# Patient Record
Sex: FEMALE | Race: WHITE | NOT HISPANIC OR LATINO | Employment: OTHER | URBAN - METROPOLITAN AREA
[De-identification: names, ages, dates, MRNs, and addresses within clinical notes are randomized per-mention and may not be internally consistent; named-entity substitution may affect disease eponyms.]

---

## 2017-05-03 VITALS
RESPIRATION RATE: 18 BRPM | BODY MASS INDEX: 26.34 KG/M2 | HEART RATE: 77 BPM | SYSTOLIC BLOOD PRESSURE: 139 MMHG | DIASTOLIC BLOOD PRESSURE: 76 MMHG | WEIGHT: 144 LBS | TEMPERATURE: 98 F

## 2017-05-03 RX ORDER — METOPROLOL SUCCINATE 50 MG/1
50 TABLET, EXTENDED RELEASE ORAL DAILY
COMMUNITY
End: 2017-06-15

## 2017-05-03 RX ORDER — LOSARTAN POTASSIUM 100 MG/1
100 TABLET ORAL DAILY
COMMUNITY

## 2017-05-03 RX ORDER — MULTIVITAMIN
1 TABLET ORAL DAILY
COMMUNITY

## 2017-05-22 RX ORDER — MULTIVITAMIN
TABLET ORAL
COMMUNITY
End: 2017-06-15 | Stop reason: SDUPTHER

## 2017-05-22 RX ORDER — MV/FA/DHA/EPA/FISH OIL/SAW/GNK 400MCG-200
COMBINATION PACKAGE (EA) ORAL
COMMUNITY

## 2017-05-22 RX ORDER — GUAIFENESIN AND PHENYLEPHRINE HCL 400; 10 MG/1; MG/1
TABLET ORAL
COMMUNITY

## 2017-05-22 RX ORDER — METOPROLOL TARTRATE 25 MG/1
TABLET, FILM COATED ORAL
COMMUNITY
End: 2017-06-15 | Stop reason: SDUPTHER

## 2017-05-23 ENCOUNTER — TELEPHONE (OUTPATIENT)
Dept: HEMATOLOGY/ONCOLOGY | Facility: CLINIC | Age: 72
End: 2017-05-23

## 2017-06-15 ENCOUNTER — OFFICE VISIT (OUTPATIENT)
Dept: HEMATOLOGY/ONCOLOGY | Facility: CLINIC | Age: 72
End: 2017-06-15
Payer: MEDICARE

## 2017-06-15 VITALS
TEMPERATURE: 98 F | RESPIRATION RATE: 18 BRPM | HEIGHT: 62 IN | HEART RATE: 101 BPM | WEIGHT: 146.19 LBS | SYSTOLIC BLOOD PRESSURE: 136 MMHG | BODY MASS INDEX: 26.9 KG/M2 | DIASTOLIC BLOOD PRESSURE: 80 MMHG

## 2017-06-15 DIAGNOSIS — C50.411 MALIGNANT NEOPLASM OF UPPER-OUTER QUADRANT OF RIGHT FEMALE BREAST: ICD-10-CM

## 2017-06-15 DIAGNOSIS — T45.1X5A ANEMIA DUE TO CHEMOTHERAPY: ICD-10-CM

## 2017-06-15 DIAGNOSIS — Z17.1 ESTROGEN RECEPTOR NEGATIVE STATUS (ER-): ICD-10-CM

## 2017-06-15 DIAGNOSIS — D64.81 ANEMIA DUE TO CHEMOTHERAPY: ICD-10-CM

## 2017-06-15 PROCEDURE — 1159F MED LIST DOCD IN RCRD: CPT | Mod: ,,, | Performed by: INTERNAL MEDICINE

## 2017-06-15 PROCEDURE — 1126F AMNT PAIN NOTED NONE PRSNT: CPT | Mod: ,,, | Performed by: INTERNAL MEDICINE

## 2017-06-15 PROCEDURE — 99214 OFFICE O/P EST MOD 30 MIN: CPT | Mod: ,,, | Performed by: INTERNAL MEDICINE

## 2017-06-15 RX ORDER — METOPROLOL TARTRATE 25 MG/1
25 TABLET, FILM COATED ORAL 2 TIMES DAILY
COMMUNITY

## 2017-06-15 NOTE — PROGRESS NOTES
[facility] Hematology/Oncology            PROGRESS NOTE      Subjective:       Patient ID:   NAME: Eugenia Darling : 1945     71 y.o. female    Referring Doc: Yoselyn  Other Physicians: Tammi Fernandez    Chief Complaint:  Breast CA f/u    History of Present Illness:     Patient returns today for a regularly scheduled follow-up visit.  The patient is here with her . She is doing ok with no new issues. No CP, SOB, HA's or N/V            ROS:   GEN: normal without any fever, night sweats or weight loss  HEENT: normal with no HA's, sore throat, stiff neck, changes in vision  CV: normal with no CP, SOB, PND, LEAVITT or orthopnea  PULM: normal with no SOB, cough, hemoptysis, sputum or pleuritic pain  GI: normal with no abdominal pain, nausea, vomiting, constipation, diarrhea, melanotic stools, BRBPR, or hematemesis  : normal with no hematuria, dysuria  BREAST: normal with no mass, discharge, pain  SKIN: normal with no rash, erythema, bruising, or swelling    Allergies:  Review of patient's allergies indicates:  No Known Allergies    Medications:    Current Outpatient Prescriptions:     aspirin 81 MG Chew, Take 81 mg by mouth once daily., Disp: , Rfl:     CALCIUM CARBONATE (CALCIUM 500 ORAL), Take 500 mg by mouth once daily., Disp: , Rfl:     krill oil 500 mg Cap, Take by mouth., Disp: , Rfl:     losartan (COZAAR) 100 MG tablet, Take 100 mg by mouth once daily., Disp: , Rfl:     metoprolol tartrate (LOPRESSOR) 25 MG tablet, Take 25 mg by mouth 2 (two) times daily., Disp: , Rfl:     multivitamin (ONE DAILY MULTIVITAMIN) per tablet, Take 1 tablet by mouth once daily., Disp: , Rfl:     simvastatin (ZOCOR) 10 MG tablet, Take 10 mg by mouth every evening., Disp: , Rfl:     turmeric root extract 500 mg Cap, Take by mouth., Disp: , Rfl:     PMHx/PSHx Updates:  See patient's last visit with me on 2017.  See H&P on 11/16/15      Pathology:  See Vol #1 11/16/15 note          Objective:     Vitals:  Blood  "pressure 136/80, pulse 101, temperature 98.1 °F (36.7 °C), resp. rate 18, height 5' 1.5" (1.562 m), weight 66.3 kg (146 lb 3.2 oz).    Physical Examination:   GEN: no apparent distress, comfortable; AAOx3  HEAD: atraumatic and normocephalic  EYES: no pallor, no icterus, PERRLA  ENT: OMM, no pharyngeal erythema, external ears WNL; no nasal discharge; no thrush  NECK: no masses, thyroid normal, trachea midline, no LAD/LN's, supple  CV: RRR with no murmur; normal pulse; normal S1 and S2; no pedal edema  CHEST: Normal respiratory effort; CTAB; normal breath sounds; no wheeze or crackles  ABDOM: nontender and nondistended; soft; normal bowel sounds; no rebound/guarding  MUSC/Skeletal: ROM normal; no crepitus; joints normal; no deformities or arthropathy  EXTREM: no clubbing, cyanosis, inflammation or swelling  SKIN: no rashes, lesions, ulcers, petechiae or subcutaneous nodules  : no turner  NEURO: grossly intact; motor/sensory WNL; AAOx3; no tremors  PSYCH: normal mood, affect and behavior  LYMPH: normal cervical, supraclavicular, axillary and groin LN's  BREAST: no changes          Labs:   5/22/17 [facility]    I have reviewed all available lab results and radiology reports.    Radiology/Diagnostic Studies:        Assessment/Plan:   (1) 71 y.o. female with diagnosis of right breast cancer  - stage IIIA   Triple neg  - s/p right lumpectomy in Oct 2015 by Dr Cadena with 4 out of 13 LN's that were positive  - s/p AC, taxol and XRT  - jabier on 5/17/17 stable  - mammo due Oct 2017  - PET/CT 12/14/16 negative      (2) HTN - followed by Dr Topete; BP good currently    PLAN:  1. mammo due Oct 2017  2. Consider repeat PET in Dec 2017 if insurance allows otherwise at least a CXR  3. F/u with PCP, etc  4. Fax note to Dr Cadena and Yoselyn    RTC 3-4 months      I spent over 25 mins with the patient, of which over half was face to face with the patient.       Discussion:     I have explained all of the above in detail and the patient " understands all of the current recommendation(s). I have answered all of their questions to the best of my ability and to their complete satisfaction.   The patient is to continue with the current management plan.    RTC in  3-4 months            Electronically signed by Abel Calle MD

## 2017-06-15 NOTE — LETTER
Nichelle 15, 2017      Manpreet Topete MD  [address]  [place] [address]           [facility] Hematology Oncology  [address] [facility] [facility]  [address]  [place] [address]  Phone: [phone]  Fax: [phone]          Patient: Eugenia Darling   MR Number: [id number]   YOB: 1945   Date of Visit: 6/15/2017       Dear Dr. Manpreet Topete:    Thank you for referring Eugenia Darling to me for evaluation. Attached you will find relevant portions of my assessment and plan of care.    If you have questions, please do not hesitate to call me. I look forward to following Eugenia Darling along with you.    Sincerely,    Abel Calle MD    Enclosure  CC:  No Recipients    If you would like to receive this communication electronically, please contact [email or web address] or [phone] to request more information on [employer] Link access.    For providers and/or their staff who would like to refer a patient to [facility], please contact us through our one-stop-shop provider referral line, [facility], at [phone].    If you feel you have received this communication in error or would no longer like to receive these types of communications, please e-mail [email or web address]

## 2017-10-28 LAB
ALBUMIN SERPL-MCNC: 4.3 G/DL (ref 3.6–5.1)
ALBUMIN/GLOB SERPL: 1.7 (CALC) (ref 1–2.5)
ALP SERPL-CCNC: 68 U/L (ref 33–130)
ALT SERPL-CCNC: 19 U/L (ref 6–29)
AST SERPL-CCNC: 20 U/L (ref 10–35)
BASOPHILS # BLD AUTO: 12 CELLS/UL (ref 0–200)
BASOPHILS NFR BLD AUTO: 0.3 %
BILIRUB SERPL-MCNC: 0.7 MG/DL (ref 0.2–1.2)
BUN SERPL-MCNC: 14 MG/DL (ref 7–25)
BUN/CREAT SERPL: NORMAL (CALC) (ref 6–22)
CALCIUM SERPL-MCNC: 9.3 MG/DL (ref 8.6–10.4)
CHLORIDE SERPL-SCNC: 103 MMOL/L (ref 98–110)
CO2 SERPL-SCNC: 30 MMOL/L (ref 20–31)
CREAT SERPL-MCNC: 0.67 MG/DL (ref 0.6–0.93)
EOSINOPHIL # BLD AUTO: 129 CELLS/UL (ref 15–500)
EOSINOPHIL NFR BLD AUTO: 3.3 %
ERYTHROCYTE [DISTWIDTH] IN BLOOD BY AUTOMATED COUNT: 11.6 % (ref 11–15)
GFR SERPL CREATININE-BSD FRML MDRD: 88 ML/MIN/1.73M2
GLOBULIN SER CALC-MCNC: 2.6 G/DL (CALC) (ref 1.9–3.7)
GLUCOSE SERPL-MCNC: 94 MG/DL (ref 65–99)
HCT VFR BLD AUTO: 39.5 % (ref 35–45)
HGB BLD-MCNC: 13.4 G/DL (ref 11.7–15.5)
LYMPHOCYTES # BLD AUTO: 1158 CELLS/UL (ref 850–3900)
LYMPHOCYTES NFR BLD AUTO: 29.7 %
MCH RBC QN AUTO: 31.6 PG (ref 27–33)
MCHC RBC AUTO-ENTMCNC: 33.9 G/DL (ref 32–36)
MCV RBC AUTO: 93.2 FL (ref 80–100)
MONOCYTES # BLD AUTO: 546 CELLS/UL (ref 200–950)
MONOCYTES NFR BLD AUTO: 14 %
NEUTROPHILS # BLD AUTO: 2055 CELLS/UL (ref 1500–7800)
NEUTROPHILS NFR BLD AUTO: 52.7 %
PLATELET # BLD AUTO: 172 THOUSAND/UL (ref 140–400)
PMV BLD REES-ECKER: 10.6 FL (ref 7.5–12.5)
POTASSIUM SERPL-SCNC: 4.8 MMOL/L (ref 3.5–5.3)
PROT SERPL-MCNC: 6.9 G/DL (ref 6.1–8.1)
RBC # BLD AUTO: 4.24 MILLION/UL (ref 3.8–5.1)
SODIUM SERPL-SCNC: 142 MMOL/L (ref 135–146)
WBC # BLD AUTO: 3.9 THOUSAND/UL (ref 3.8–10.8)

## 2017-10-30 ENCOUNTER — TELEPHONE (OUTPATIENT)
Dept: HEMATOLOGY/ONCOLOGY | Facility: CLINIC | Age: 72
End: 2017-10-30

## 2017-10-31 ENCOUNTER — OFFICE VISIT (OUTPATIENT)
Dept: HEMATOLOGY/ONCOLOGY | Facility: CLINIC | Age: 72
End: 2017-10-31
Payer: MEDICARE

## 2017-10-31 VITALS
WEIGHT: 142 LBS | HEIGHT: 61 IN | BODY MASS INDEX: 26.81 KG/M2 | DIASTOLIC BLOOD PRESSURE: 85 MMHG | SYSTOLIC BLOOD PRESSURE: 137 MMHG | HEART RATE: 66 BPM | RESPIRATION RATE: 18 BRPM | TEMPERATURE: 98 F

## 2017-10-31 DIAGNOSIS — D64.81 ANEMIA DUE TO CHEMOTHERAPY: ICD-10-CM

## 2017-10-31 DIAGNOSIS — Z17.1 MALIGNANT NEOPLASM OF UPPER-OUTER QUADRANT OF RIGHT BREAST IN FEMALE, ESTROGEN RECEPTOR NEGATIVE: Primary | ICD-10-CM

## 2017-10-31 DIAGNOSIS — C50.411 MALIGNANT NEOPLASM OF UPPER-OUTER QUADRANT OF RIGHT BREAST IN FEMALE, ESTROGEN RECEPTOR NEGATIVE: Primary | ICD-10-CM

## 2017-10-31 DIAGNOSIS — Z17.1 ESTROGEN RECEPTOR NEGATIVE TUMOR STATUS: ICD-10-CM

## 2017-10-31 DIAGNOSIS — T45.1X5A ANEMIA DUE TO CHEMOTHERAPY: ICD-10-CM

## 2017-10-31 PROCEDURE — 99214 OFFICE O/P EST MOD 30 MIN: CPT | Mod: ,,, | Performed by: INTERNAL MEDICINE

## 2017-10-31 NOTE — PROGRESS NOTES
[facility] Hematology/Oncology  PROGRESS NOTE      Subjective:       Patient ID:   NAME: Eugenia Darling : 1945     71 y.o. female    Referring Doc: Yoselyn  Other Physicians: Tammi Fernandez    Chief Complaint:  Breast ca f/u    History of Present Illness:     Patient returns today for a regularly scheduled follow-up visit.  The patient is here with her . She had recent mammo yesterday which is reported good. She is doing ok with no new issues. She denies any CP, SOB, HA's or N/V. Mild but very slight and intermittent neuropathy (1x time per week)            ROS:   GEN: normal without any fever, night sweats or weight loss  HEENT: normal with no HA's, sore throat, stiff neck, changes in vision  CV: normal with no CP, SOB, PND, LEAVITT or orthopnea  PULM: normal with no SOB, cough, hemoptysis, sputum or pleuritic pain  GI: normal with no abdominal pain, nausea, vomiting, constipation, diarrhea, melanotic stools, BRBPR, or hematemesis  : normal with no hematuria, dysuria  BREAST: normal with no mass, discharge, pain  SKIN: normal with no rash, erythema, bruising, or swelling    Allergies:  Review of patient's allergies indicates:   Allergen Reactions    Adhesive tape-silicones Rash       Medications:    Current Outpatient Prescriptions:     LACTOBACILLUS RHAMNOSUS GG (CULTURELLE ORAL), Take by mouth., Disp: , Rfl:     aspirin 81 MG Chew, Take 81 mg by mouth once daily., Disp: , Rfl:     CALCIUM CARBONATE (CALCIUM 500 ORAL), Take 500 mg by mouth once daily., Disp: , Rfl:     krill oil 500 mg Cap, Take by mouth., Disp: , Rfl:     losartan (COZAAR) 100 MG tablet, Take 100 mg by mouth once daily., Disp: , Rfl:     metoprolol tartrate (LOPRESSOR) 25 MG tablet, Take 25 mg by mouth 2 (two) times daily., Disp: , Rfl:     multivitamin (ONE DAILY MULTIVITAMIN) per tablet, Take 1 tablet by mouth once daily., Disp: , Rfl:     simvastatin (ZOCOR) 10 MG tablet, Take 10 mg by mouth every evening., Disp: , Rfl:      "turmeric root extract 500 mg Cap, Take by mouth., Disp: , Rfl:     PMHx/PSHx Updates:  See patient's last visit with me on 6/15/2017.  See H&P on Vol #1        Pathology:  See Vol #1          Objective:     Vitals:  Blood pressure 137/85, pulse 66, temperature 98.1 °F (36.7 °C), resp. rate 18, height 5' 1" (1.549 m), weight 64.4 kg (142 lb).    Physical Examination:   GEN: no apparent distress, comfortable; AAOx3  HEAD: atraumatic and normocephalic  EYES: no pallor, no icterus, PERRLA  ENT: OMM, no pharyngeal erythema, external ears WNL; no nasal discharge; no thrush  NECK: no masses, thyroid normal, trachea midline, no LAD/LN's, supple  CV: RRR with no murmur; normal pulse; normal S1 and S2; no pedal edema  CHEST: Normal respiratory effort; CTAB; normal breath sounds; no wheeze or crackles  ABDOM: nontender and nondistended; soft; normal bowel sounds; no rebound/guarding  MUSC/Skeletal: ROM normal; no crepitus; joints normal; no deformities or arthropathy  EXTREM: no clubbing, cyanosis, inflammation or swelling  SKIN: no rashes, lesions, ulcers, petechiae or subcutaneous nodules  : no turner  NEURO: grossly intact; motor/sensory WNL; AAOx3; no tremors  PSYCH: normal mood, affect and behavior  LYMPH: normal cervical, supraclavicular, axillary and groin LN's  BREAST: no changes          Labs:     10/27/2017 on chart      Radiology/Diagnostic Studies:    [facility] Unknown Rad Eap    Result Date: 10/30/2017  CMS MANDATED QUALITY DATA - MAMMOGRAPHY - [facility] utilizes a reminder system to ensure that all patients receive reminder letters and/or direct phone calls for appointments. ?This includes ?reminders for routine screening mammograms, diagnostic mammograms, and other breast imaging interventions when appropriate. ?This patient will be placed in the?appropriate reminder system. BILATERAL DIGITAL DIAGNOSTIC MAMMOGRAM with CAD with tomosynthesis CLINICAL INFORMATION: Short-term follow-up following " lumpectomy for right breast carcinoma. Technologist: Ina Vega. FINDINGS: Comparison made with prior mammograms dating back to 08/13/2014. The breasts are heterogeneously dense, which lowers the sensitivity of mammography. There is scarring in the right breast at 12 o'clock position from previous lumpectomy. In the rest of the breast, there are no other areas of architectural distortion and there are no masses or suspicious grouped calcifications. IMPRESSION: There is no evidence of malignancy. Recommend routine screening. BI-RADS CATEGORY 1: NEGATIVE. Read and electronically signed by: Jonah Gu MD on 10/30/2017 9:29 AM CDT JONAH GU MD      I have reviewed all available lab results and radiology reports.    Assessment/Plan:     (1) 71 y.o. female with diagnosis of right breast cancer  - stage IIIA   Triple neg  - s/p right lumpectomy in Oct 2015 by Dr Cadena with 4 out of 13 LN's that were positive  - s/p AC, taxol and XRT  - jabier on 5/17/17 stable  - mammo 10/30 was negative  - PET/CT 12/14/16 negative        (2) HTN - followed by Dr Topete; BP good currently       PLAN:  1. Schedule PET scan for Dec 2017  2. F/u with PCP  3. RTC in 3-4 months  4. mammo next Oct 2018  5. GYN f/u in 2 months ([place])  Fax note to  Manpreet Topete MD, LENORE Packer and Tammi    Discussion:     I have explained all of the above in detail and the patient understands all of the current recommendation(s). I have answered all of their questions to the best of my ability and to their complete satisfaction.   The patient is to continue with the current management plan.            Electronically signed by Abel Calle MD

## 2018-01-11 LAB — GLUCOSE SERPL-MCNC: 92 MG/DL (ref 70–99)

## 2018-01-12 ENCOUNTER — TELEPHONE (OUTPATIENT)
Dept: HEMATOLOGY/ONCOLOGY | Facility: CLINIC | Age: 73
End: 2018-01-12

## 2018-02-19 ENCOUNTER — OFFICE VISIT (OUTPATIENT)
Dept: HEMATOLOGY/ONCOLOGY | Facility: CLINIC | Age: 73
End: 2018-02-19
Payer: MEDICARE

## 2018-02-19 VITALS
BODY MASS INDEX: 26.85 KG/M2 | HEART RATE: 69 BPM | RESPIRATION RATE: 18 BRPM | TEMPERATURE: 98 F | WEIGHT: 142.13 LBS | DIASTOLIC BLOOD PRESSURE: 86 MMHG | SYSTOLIC BLOOD PRESSURE: 149 MMHG

## 2018-02-19 DIAGNOSIS — T45.1X5A ANEMIA DUE TO CHEMOTHERAPY: ICD-10-CM

## 2018-02-19 DIAGNOSIS — D64.81 ANEMIA DUE TO CHEMOTHERAPY: ICD-10-CM

## 2018-02-19 DIAGNOSIS — C50.411 MALIGNANT NEOPLASM OF UPPER-OUTER QUADRANT OF RIGHT BREAST IN FEMALE, ESTROGEN RECEPTOR NEGATIVE: Primary | ICD-10-CM

## 2018-02-19 DIAGNOSIS — Z17.1 MALIGNANT NEOPLASM OF UPPER-OUTER QUADRANT OF RIGHT BREAST IN FEMALE, ESTROGEN RECEPTOR NEGATIVE: Primary | ICD-10-CM

## 2018-02-19 DIAGNOSIS — Z17.1 ESTROGEN RECEPTOR NEGATIVE TUMOR STATUS: ICD-10-CM

## 2018-02-19 PROCEDURE — 1126F AMNT PAIN NOTED NONE PRSNT: CPT | Mod: ,,, | Performed by: INTERNAL MEDICINE

## 2018-02-19 PROCEDURE — 99214 OFFICE O/P EST MOD 30 MIN: CPT | Mod: ,,, | Performed by: INTERNAL MEDICINE

## 2018-02-19 PROCEDURE — 1159F MED LIST DOCD IN RCRD: CPT | Mod: ,,, | Performed by: INTERNAL MEDICINE

## 2018-02-19 PROCEDURE — 3008F BODY MASS INDEX DOCD: CPT | Mod: ,,, | Performed by: INTERNAL MEDICINE

## 2018-02-19 NOTE — PROGRESS NOTES
[facility] Hematology/Oncology  PROGRESS NOTE      Subjective:       Patient ID:   NAME: Eugenia Darling : 1945     72 y.o. female    Referring Doc: Yoselyn  Other Physicians: Tammi Fernandez    Chief Complaint:  Breast ca f/u    History of Present Illness:     Patient returns today for a regularly scheduled follow-up visit.  The patient is here with her . She reports that she feels great. She just got back from a cruise. She is doing ok with no new issues. She denies any CP, SOB, HA's or N/V. Mild but very slight and intermittent neuropathy (1x time per week)            ROS:   GEN: normal without any fever, night sweats or weight loss  HEENT: normal with no HA's, sore throat, stiff neck, changes in vision  CV: normal with no CP, SOB, PND, LEAVITT or orthopnea  PULM: normal with no SOB, cough, hemoptysis, sputum or pleuritic pain  GI: normal with no abdominal pain, nausea, vomiting, constipation, diarrhea, melanotic stools, BRBPR, or hematemesis  : normal with no hematuria, dysuria  BREAST: normal with no mass, discharge, pain  SKIN: normal with no rash, erythema, bruising, or swelling    Allergies:  Review of patient's allergies indicates:   Allergen Reactions    Adhesive tape-silicones Rash       Medications:    Current Outpatient Prescriptions:     aspirin 81 MG Chew, Take 81 mg by mouth once daily., Disp: , Rfl:     CALCIUM CARBONATE (CALCIUM 500 ORAL), Take 500 mg by mouth once daily., Disp: , Rfl:     krill oil 500 mg Cap, Take by mouth., Disp: , Rfl:     LACTOBACILLUS RHAMNOSUS GG (CULTURELLE ORAL), Take by mouth., Disp: , Rfl:     losartan (COZAAR) 100 MG tablet, Take 100 mg by mouth once daily., Disp: , Rfl:     metoprolol tartrate (LOPRESSOR) 25 MG tablet, Take 25 mg by mouth 2 (two) times daily., Disp: , Rfl:     multivitamin (ONE DAILY MULTIVITAMIN) per tablet, Take 1 tablet by mouth once daily., Disp: , Rfl:     simvastatin (ZOCOR) 10 MG tablet, Take 10 mg by mouth every evening., Disp:  , Rfl:     turmeric root extract 500 mg Cap, Take by mouth., Disp: , Rfl:     PMHx/PSHx Updates:  See patient's last visit with me on 12/19/2017.  See H&P on Vol #1Dictation #1  MRN:[id number]  CSN:[id number]          Pathology:  See Vol #1          Objective:     Vitals:  Blood pressure (!) 149/86, pulse 69, temperature 97.9 °F (36.6 °C), resp. rate 18, weight 64.5 kg (142 lb 1.6 oz).    Physical Examination:   GEN: no apparent distress, comfortable; AAOx3  HEAD: atraumatic and normocephalic  EYES: no pallor, no icterus, PERRLA  ENT: OMM, no pharyngeal erythema, external ears WNL; no nasal discharge; no thrush  NECK: no masses, thyroid normal, trachea midline, no LAD/LN's, supple  CV: RRR with no murmur; normal pulse; normal S1 and S2; no pedal edema  CHEST: Normal respiratory effort; CTAB; normal breath sounds; no wheeze or crackles  ABDOM: nontender and nondistended; soft; normal bowel sounds; no rebound/guarding  MUSC/Skeletal: ROM normal; no crepitus; joints normal; no deformities or arthropathy  EXTREM: no clubbing, cyanosis, inflammation or swelling  SKIN: no rashes, lesions, ulcers, petechiae or subcutaneous nodules  : no turner  NEURO: grossly intact; motor/sensory WNL; AAOx3; no tremors  PSYCH: normal mood, affect and behavior  LYMPH: normal cervical, supraclavicular, axillary and groin LN's  BREAST: no changes          Labs:     10/27/2017 on chart      Radiology/Diagnostic Studies:        PET scan:   1/11/2018    IMPRESSION:    Negative for metastatic disease or residual/recurrent malignancy.          [facility] Unknown Rad Eap    Result Date: 10/30/2017  CMS MANDATED QUALITY DATA - MAMMOGRAPHY - [facility] utilizes a reminder system to ensure that all patients receive reminder letters and/or direct phone calls for appointments. ?This includes ?reminders for routine screening mammograms, diagnostic mammograms, and other breast imaging interventions when appropriate. ?This patient will be  placed in the?appropriate reminder system. BILATERAL DIGITAL DIAGNOSTIC MAMMOGRAM with CAD with tomosynthesis CLINICAL INFORMATION: Short-term follow-up following lumpectomy for right breast carcinoma. Technologist: Ina Vega. FINDINGS: Comparison made with prior mammograms dating back to 08/13/2014. The breasts are heterogeneously dense, which lowers the sensitivity of mammography. There is scarring in the right breast at 12 o'clock position from previous lumpectomy. In the rest of the breast, there are no other areas of architectural distortion and there are no masses or suspicious grouped calcifications. IMPRESSION: There is no evidence of malignancy. Recommend routine screening. BI-RADS CATEGORY 1: NEGATIVE. Read and electronically signed by: Jonah Gu MD on 10/30/2017 9:29 AM CDT JONAH GU MD      I have reviewed all available lab results and radiology reports.    Assessment/Plan:     (1) 72 y.o. female with diagnosis of right breast cancer  - stage IIIA   Triple neg  - s/p right lumpectomy in Oct 2015 by Dr Cadena with 4 out of 13 LN's that were positive  - s/p AC, taxol and XRT  - jabier on 5/17/17 stable  - mammo 10/30 was negative  - PET/CT 1/11/2018 is negative        (2) HTN - followed by Dr Topete; BP good currently       PLAN:  1. Check up to date labs  2. F/u with PCP, GYN, Gen Surg etc  3. RTC in 3-4 months  4. mammo next Oct 2018  5. Fax note to  Manpreet Topete MD, LENORE Packer and Tammi    Discussion:     I have explained all of the above in detail and the patient understands all of the current recommendation(s). I have answered all of their questions to the best of my ability and to their complete satisfaction.   The patient is to continue with the current management plan.            Electronically signed by Abel Calle MD

## 2018-02-19 NOTE — LETTER
February 19, 2018      Manpreet Topete MD  [address]  [place] [address]           [facility] Hematology Oncology  [address]  [address]  [place] [address]  Phone: [phone]  Fax: [phone]          Patient: Eugenia Darling   MR Number: [id number]   YOB: 1945   Date of Visit: 2/19/2018       Dear Dr. Manpreet Topete:    Thank you for referring Eugenia Darling to me for evaluation. Attached you will find relevant portions of my assessment and plan of care.    If you have questions, please do not hesitate to call me. I look forward to following Eugenia Darling along with you.    Sincerely,    Abel Calle MD    Enclosure  CC:  No Recipients    If you would like to receive this communication electronically, please contact [email or web address] or [phone] to request more information on [email or web address] access.    For providers and/or their staff who would like to refer a patient to [facility], please contact us through our one-stop-shop provider referral line, [facility], at [phone].    If you feel you have received this communication in error or would no longer like to receive these types of communications, please e-mail [email or web address]

## 2018-06-18 ENCOUNTER — OFFICE VISIT (OUTPATIENT)
Dept: HEMATOLOGY/ONCOLOGY | Facility: CLINIC | Age: 73
End: 2018-06-18
Payer: MEDICARE

## 2018-06-18 VITALS
HEART RATE: 92 BPM | HEIGHT: 62 IN | WEIGHT: 144.38 LBS | DIASTOLIC BLOOD PRESSURE: 89 MMHG | SYSTOLIC BLOOD PRESSURE: 165 MMHG | BODY MASS INDEX: 26.57 KG/M2 | TEMPERATURE: 98 F

## 2018-06-18 DIAGNOSIS — C50.411 MALIGNANT NEOPLASM OF UPPER-OUTER QUADRANT OF RIGHT BREAST IN FEMALE, ESTROGEN RECEPTOR NEGATIVE: ICD-10-CM

## 2018-06-18 DIAGNOSIS — R19.7 DIARRHEA, UNSPECIFIED TYPE: ICD-10-CM

## 2018-06-18 DIAGNOSIS — Z17.1 ESTROGEN RECEPTOR NEGATIVE TUMOR STATUS: Primary | ICD-10-CM

## 2018-06-18 DIAGNOSIS — Z17.1 MALIGNANT NEOPLASM OF UPPER-OUTER QUADRANT OF RIGHT BREAST IN FEMALE, ESTROGEN RECEPTOR NEGATIVE: ICD-10-CM

## 2018-06-18 PROCEDURE — 99214 OFFICE O/P EST MOD 30 MIN: CPT | Mod: ,,, | Performed by: INTERNAL MEDICINE

## 2018-06-18 NOTE — PROGRESS NOTES
[facility] Hematology/Oncology  PROGRESS NOTE      Subjective:       Patient ID:   NAME: Eugenia Darling : 1945     72 y.o. female    Referring Doc: Yoselyn  Other Physicians: Tammi Fernandez, Jd    Chief Complaint:  Breast ca f/u    History of Present Illness:     Patient returns today for a regularly scheduled follow-up visit.  The patient is here with her . She reports that she feels great. She just got back from a cruise. She is doing ok with no new issues. She denies any CP, SOB, HA's or N/V. She has been having some chronic diarrhea and she has seen Dr Topete for it          ROS:   GEN: normal without any fever, night sweats or weight loss  HEENT: normal with no HA's, sore throat, stiff neck, changes in vision  CV: normal with no CP, SOB, PND, LEAVITT or orthopnea  PULM: normal with no SOB, cough, hemoptysis, sputum or pleuritic pain  GI:  no abdominal pain, nausea, vomiting, constipation,, melanotic stools, BRBPR, or hematemesis; diarrhea chronic  : normal with no hematuria, dysuria  BREAST: normal with no mass, discharge, pain  SKIN: normal with no rash, erythema, bruising, or swelling    Allergies:  Review of patient's allergies indicates:   Allergen Reactions    Adhesive tape-silicones Rash       Medications:    Current Outpatient Prescriptions:     aspirin 81 MG Chew, Take 81 mg by mouth once daily., Disp: , Rfl:     CALCIUM CARBONATE (CALCIUM 500 ORAL), Take 500 mg by mouth once daily., Disp: , Rfl:     krill oil 500 mg Cap, Take by mouth., Disp: , Rfl:     LACTOBACILLUS RHAMNOSUS GG (CULTURELLE ORAL), Take by mouth., Disp: , Rfl:     losartan (COZAAR) 100 MG tablet, Take 100 mg by mouth once daily., Disp: , Rfl:     metoprolol tartrate (LOPRESSOR) 25 MG tablet, Take 25 mg by mouth 2 (two) times daily., Disp: , Rfl:     multivitamin (ONE DAILY MULTIVITAMIN) per tablet, Take 1 tablet by mouth once daily., Disp: , Rfl:     simvastatin (ZOCOR) 10 MG tablet, Take 10 mg by mouth every  "evening., Disp: , Rfl:     turmeric root extract 500 mg Cap, Take by mouth., Disp: , Rfl:     PMHx/PSHx Updates:  See patient's last visit with me on 2/19/2018  See H&P on Vol #1            Pathology:  See Vol #1          Objective:     Vitals:  Blood pressure (!) 165/89, pulse 92, temperature 98.4 °F (36.9 °C), height 5' 1.5" (1.562 m), weight 65.5 kg (144 lb 6.4 oz).    Physical Examination:   GEN: no apparent distress, comfortable; AAOx3  HEAD: atraumatic and normocephalic  EYES: no pallor, no icterus, PERRLA  ENT: OMM, no pharyngeal erythema, external ears WNL; no nasal discharge; no thrush  NECK: no masses, thyroid normal, trachea midline, no LAD/LN's, supple  CV: RRR with no murmur; normal pulse; normal S1 and S2; no pedal edema  CHEST: Normal respiratory effort; CTAB; normal breath sounds; no wheeze or crackles  ABDOM: nontender and nondistended; soft; normal bowel sounds; no rebound/guarding  MUSC/Skeletal: ROM normal; no crepitus; joints normal; no deformities or arthropathy  EXTREM: no clubbing, cyanosis, inflammation or swelling  SKIN: no rashes, lesions, ulcers, petechiae or subcutaneous nodules  : no turner  NEURO: grossly intact; motor/sensory WNL; AAOx3; no tremors  PSYCH: normal mood, affect and behavior  LYMPH: normal cervical, supraclavicular, axillary and groin LN's  BREAST: no changes          Labs:     6/13/2018  Lab Results   Component Value Date    WBC 4.6 06/13/2018    HGB 12.3 06/13/2018    HCT 36.2 06/13/2018    MCV 92.6 06/13/2018     06/13/2018     BMP  Lab Results   Component Value Date     06/13/2018    K 4.7 06/13/2018     06/13/2018    CO2 29 06/13/2018    BUN 13 06/13/2018    CREATININE 0.64 06/13/2018    CALCIUM 9.0 06/13/2018    ESTGFRAFRICA 103 06/13/2018    EGFRNONAA 89 06/13/2018     Lab Results   Component Value Date    ALT 23 06/13/2018    AST 22 06/13/2018    ALKPHOS 63 06/13/2018    BILITOT 0.7 06/13/2018           Radiology/Diagnostic " Studies:        PET scan:   1/11/2018    IMPRESSION:    Negative for metastatic disease or residual/recurrent malignancy.          [facility] Unknown Rad Eap    Result Date: 10/30/2017  CMS MANDATED QUALITY DATA - MAMMOGRAPHY - [facility] utilizes a reminder system to ensure that all patients receive reminder letters and/or direct phone calls for appointments. ?This includes ?reminders for routine screening mammograms, diagnostic mammograms, and other breast imaging interventions when appropriate. ?This patient will be placed in the?appropriate reminder system. BILATERAL DIGITAL DIAGNOSTIC MAMMOGRAM with CAD with tomosynthesis CLINICAL INFORMATION: Short-term follow-up following lumpectomy for right breast carcinoma. Technologist: Ina Vega. FINDINGS: Comparison made with prior mammograms dating back to 08/13/2014. The breasts are heterogeneously dense, which lowers the sensitivity of mammography. There is scarring in the right breast at 12 o'clock position from previous lumpectomy. In the rest of the breast, there are no other areas of architectural distortion and there are no masses or suspicious grouped calcifications.     IMPRESSION: There is no evidence of malignancy. Recommend routine screening. BI-RADS CATEGORY 1: NEGATIVE. Read and electronically signed by: Jonah Gu MD on 10/30/2017 9:29 AM CDT JONAH GU MD      I have reviewed all available lab results and radiology reports.    Assessment/Plan:     (1) 72 y.o. female with diagnosis of right breast cancer  - stage IIIA   Triple neg  - s/p right lumpectomy in Oct 2015 by Dr Cadena with 4 out of 13 LN's that were positive  - s/p AC, taxol and XRT  - mammo 10/30/2017 was negative  - PET/CT 1/11/2018 is negative        (2) HTN - followed by Dr Topete; BP good currently    (3) chronic diarrhea issues - will refer her back to Dr Miles       PLAN:  1. Check up to date labs  2. F/u with PCP, GYN, Gen Surg etc  3. RTC in 3-4  months  4. mammo next Oct 2018  5. Refer to MARA Ramirez Dr Jd   Fax note to  Manpreet Topete MD, LENORE Packer, Jd and Tammi    Discussion:     I have explained all of the above in detail and the patient understands all of the current recommendation(s). I have answered all of their questions to the best of my ability and to their complete satisfaction.   The patient is to continue with the current management plan.            Electronically signed by Abel Calle MD

## 2018-06-18 NOTE — LETTER
June 18, 2018      Manpreet Topete MD  [address] [facility] [address] [facility]  [place] [address]           [facility] - Hematology Oncology  [address]  [address]  [place] [address]  Phone: [phone]  Fax: [phone]          Patient: Eugenia Darling   MR Number: [id number]   YOB: 1945   Date of Visit: 6/18/2018       Dear Dr. Manpreet Topete:    Thank you for referring Eugenia Darling to me for evaluation. Attached you will find relevant portions of my assessment and plan of care.    If you have questions, please do not hesitate to call me. I look forward to following Eugenia Darling along with you.    Sincerely,    Abel Calle MD    Enclosure  CC:  No Recipients    If you would like to receive this communication electronically, please contact [email or web address] or [phone] to request more information on [email or web address] Link access.    For providers and/or their staff who would like to refer a patient to [facility], please contact us through our one-stop-shop provider referral line, [facility] [place], at [phone].    If you feel you have received this communication in error or would no longer like to receive these types of communications, please e-mail [email or web address]

## 2018-11-02 LAB
ALBUMIN SERPL-MCNC: 4.3 G/DL (ref 3.6–5.1)
ALBUMIN/GLOB SERPL: 1.3 (CALC) (ref 1–2.5)
ALP SERPL-CCNC: 60 U/L (ref 33–130)
ALT SERPL-CCNC: 17 U/L (ref 6–29)
AST SERPL-CCNC: 18 U/L (ref 10–35)
BASOPHILS # BLD AUTO: 21 CELLS/UL (ref 0–200)
BASOPHILS NFR BLD AUTO: 0.5 %
BILIRUB SERPL-MCNC: 0.8 MG/DL (ref 0.2–1.2)
BUN SERPL-MCNC: 16 MG/DL (ref 7–25)
BUN/CREAT SERPL: NORMAL (CALC) (ref 6–22)
CALCIUM SERPL-MCNC: 9.3 MG/DL (ref 8.6–10.4)
CHLORIDE SERPL-SCNC: 102 MMOL/L (ref 98–110)
CO2 SERPL-SCNC: 31 MMOL/L (ref 20–32)
CREAT SERPL-MCNC: 0.67 MG/DL (ref 0.6–0.93)
EOSINOPHIL # BLD AUTO: 143 CELLS/UL (ref 15–500)
EOSINOPHIL NFR BLD AUTO: 3.4 %
ERYTHROCYTE [DISTWIDTH] IN BLOOD BY AUTOMATED COUNT: 11.8 % (ref 11–15)
GFR SERPL CREATININE-BSD FRML MDRD: 88 ML/MIN/1.73M2
GLOBULIN SER CALC-MCNC: 3.4 G/DL (CALC) (ref 1.9–3.7)
GLUCOSE SERPL-MCNC: 94 MG/DL (ref 65–99)
HCT VFR BLD AUTO: 39.1 % (ref 35–45)
HGB BLD-MCNC: 13.1 G/DL (ref 11.7–15.5)
LYMPHOCYTES # BLD AUTO: 1142 CELLS/UL (ref 850–3900)
LYMPHOCYTES NFR BLD AUTO: 27.2 %
MCH RBC QN AUTO: 31 PG (ref 27–33)
MCHC RBC AUTO-ENTMCNC: 33.5 G/DL (ref 32–36)
MCV RBC AUTO: 92.4 FL (ref 80–100)
MONOCYTES # BLD AUTO: 596 CELLS/UL (ref 200–950)
MONOCYTES NFR BLD AUTO: 14.2 %
NEUTROPHILS # BLD AUTO: 2297 CELLS/UL (ref 1500–7800)
NEUTROPHILS NFR BLD AUTO: 54.7 %
PLATELET # BLD AUTO: 167 THOUSAND/UL (ref 140–400)
PMV BLD REES-ECKER: 11 FL (ref 7.5–12.5)
POTASSIUM SERPL-SCNC: 4 MMOL/L (ref 3.5–5.3)
PROT SERPL-MCNC: 7.7 G/DL (ref 6.1–8.1)
RBC # BLD AUTO: 4.23 MILLION/UL (ref 3.8–5.1)
SODIUM SERPL-SCNC: 139 MMOL/L (ref 135–146)
WBC # BLD AUTO: 4.2 THOUSAND/UL (ref 3.8–10.8)

## 2018-11-05 ENCOUNTER — OFFICE VISIT (OUTPATIENT)
Dept: HEMATOLOGY/ONCOLOGY | Facility: CLINIC | Age: 73
End: 2018-11-05
Payer: MEDICARE

## 2018-11-05 VITALS
DIASTOLIC BLOOD PRESSURE: 84 MMHG | SYSTOLIC BLOOD PRESSURE: 145 MMHG | HEART RATE: 85 BPM | TEMPERATURE: 98 F | RESPIRATION RATE: 20 BRPM | BODY MASS INDEX: 27.4 KG/M2 | WEIGHT: 147.38 LBS

## 2018-11-05 DIAGNOSIS — C50.411 MALIGNANT NEOPLASM OF UPPER-OUTER QUADRANT OF RIGHT BREAST IN FEMALE, ESTROGEN RECEPTOR NEGATIVE: Primary | ICD-10-CM

## 2018-11-05 DIAGNOSIS — T45.1X5A ANEMIA DUE TO CHEMOTHERAPY: ICD-10-CM

## 2018-11-05 DIAGNOSIS — D64.81 ANEMIA DUE TO CHEMOTHERAPY: ICD-10-CM

## 2018-11-05 DIAGNOSIS — Z17.1 ESTROGEN RECEPTOR NEGATIVE TUMOR STATUS: ICD-10-CM

## 2018-11-05 DIAGNOSIS — Z17.1 MALIGNANT NEOPLASM OF UPPER-OUTER QUADRANT OF RIGHT BREAST IN FEMALE, ESTROGEN RECEPTOR NEGATIVE: Primary | ICD-10-CM

## 2018-11-05 PROCEDURE — 99214 OFFICE O/P EST MOD 30 MIN: CPT | Mod: ,,, | Performed by: INTERNAL MEDICINE

## 2018-11-05 NOTE — LETTER
November 5, 2018      Manpreet Topete MD  [address] [facility]  [place] [address]           [facility] - Hematology Oncology  [address]  [address]  [place] [address]  Phone: [phone]  Fax: [phone]          Patient: Eugenia Darling   MR Number: [id number]   YOB: 1945   Date of Visit: 11/5/2018       Dear Dr. Manpreet Topete:    Thank you for referring Eugenia Darling to me for evaluation. Attached you will find relevant portions of my assessment and plan of care.    If you have questions, please do not hesitate to call me. I look forward to following Eugenia Darling along with you.    Sincerely,    Abel Calle MD    Enclosure  CC:  No Recipients    If you would like to receive this communication electronically, please contact [email or web address] or [phone] to request more information on [employer] Link access.    For providers and/or their staff who would like to refer a patient to [facility], please contact us through our one-stop-shop provider referral line, [facility], at [phone].    If you feel you have received this communication in error or would no longer like to receive these types of communications, please e-mail [email or web address]

## 2019-02-11 ENCOUNTER — TELEPHONE (OUTPATIENT)
Dept: HEMATOLOGY/ONCOLOGY | Facility: CLINIC | Age: 74
End: 2019-02-11

## 2019-02-11 NOTE — TELEPHONE ENCOUNTER
Called patient let her know that her Pet scan was negative     ----- Message from Abel Calle MD sent at 2/11/2019  2:21 PM CST -----  Call her with good report

## 2019-03-07 LAB
ALBUMIN SERPL-MCNC: 4.4 G/DL (ref 3.6–5.1)
ALBUMIN/GLOB SERPL: 1.4 (CALC) (ref 1–2.5)
ALP SERPL-CCNC: 62 U/L (ref 33–130)
ALT SERPL-CCNC: 16 U/L (ref 6–29)
AST SERPL-CCNC: 17 U/L (ref 10–35)
BASOPHILS # BLD AUTO: 21 CELLS/UL (ref 0–200)
BASOPHILS NFR BLD AUTO: 0.5 %
BILIRUB SERPL-MCNC: 0.7 MG/DL (ref 0.2–1.2)
BUN SERPL-MCNC: 14 MG/DL (ref 7–25)
BUN/CREAT SERPL: NORMAL (CALC) (ref 6–22)
CALCIUM SERPL-MCNC: 9.6 MG/DL (ref 8.6–10.4)
CHLORIDE SERPL-SCNC: 102 MMOL/L (ref 98–110)
CO2 SERPL-SCNC: 30 MMOL/L (ref 20–32)
CREAT SERPL-MCNC: 0.64 MG/DL (ref 0.6–0.93)
EOSINOPHIL # BLD AUTO: 201 CELLS/UL (ref 15–500)
EOSINOPHIL NFR BLD AUTO: 4.9 %
ERYTHROCYTE [DISTWIDTH] IN BLOOD BY AUTOMATED COUNT: 12 % (ref 11–15)
GFRSERPLBLD MDRD-ARVRAT: 89 ML/MIN/1.73M2
GLOBULIN SER CALC-MCNC: 3.1 G/DL (CALC) (ref 1.9–3.7)
GLUCOSE SERPL-MCNC: 93 MG/DL (ref 65–99)
HCT VFR BLD AUTO: 38.7 % (ref 35–45)
HGB BLD-MCNC: 13.1 G/DL (ref 11.7–15.5)
LYMPHOCYTES # BLD AUTO: 1275 CELLS/UL (ref 850–3900)
LYMPHOCYTES NFR BLD AUTO: 31.1 %
MCH RBC QN AUTO: 31.6 PG (ref 27–33)
MCHC RBC AUTO-ENTMCNC: 33.9 G/DL (ref 32–36)
MCV RBC AUTO: 93.3 FL (ref 80–100)
MONOCYTES # BLD AUTO: 463 CELLS/UL (ref 200–950)
MONOCYTES NFR BLD AUTO: 11.3 %
NEUTROPHILS # BLD AUTO: 2140 CELLS/UL (ref 1500–7800)
NEUTROPHILS NFR BLD AUTO: 52.2 %
PLATELET # BLD AUTO: 180 THOUSAND/UL (ref 140–400)
PMV BLD REES-ECKER: 10.9 FL (ref 7.5–12.5)
POTASSIUM SERPL-SCNC: 4.5 MMOL/L (ref 3.5–5.3)
PROT SERPL-MCNC: 7.5 G/DL (ref 6.1–8.1)
RBC # BLD AUTO: 4.15 MILLION/UL (ref 3.8–5.1)
SODIUM SERPL-SCNC: 140 MMOL/L (ref 135–146)
WBC # BLD AUTO: 4.1 THOUSAND/UL (ref 3.8–10.8)

## 2019-03-11 ENCOUNTER — OFFICE VISIT (OUTPATIENT)
Dept: HEMATOLOGY/ONCOLOGY | Facility: CLINIC | Age: 74
End: 2019-03-11
Payer: MEDICARE

## 2019-03-11 VITALS
RESPIRATION RATE: 20 BRPM | TEMPERATURE: 98 F | WEIGHT: 147.38 LBS | SYSTOLIC BLOOD PRESSURE: 129 MMHG | HEART RATE: 83 BPM | DIASTOLIC BLOOD PRESSURE: 74 MMHG | BODY MASS INDEX: 27.4 KG/M2

## 2019-03-11 DIAGNOSIS — Z17.1 MALIGNANT NEOPLASM OF UPPER-OUTER QUADRANT OF RIGHT BREAST IN FEMALE, ESTROGEN RECEPTOR NEGATIVE: Primary | ICD-10-CM

## 2019-03-11 DIAGNOSIS — C50.411 MALIGNANT NEOPLASM OF UPPER-OUTER QUADRANT OF RIGHT BREAST IN FEMALE, ESTROGEN RECEPTOR NEGATIVE: Primary | ICD-10-CM

## 2019-03-11 DIAGNOSIS — T45.1X5A ANEMIA DUE TO CHEMOTHERAPY: ICD-10-CM

## 2019-03-11 DIAGNOSIS — D64.81 ANEMIA DUE TO CHEMOTHERAPY: ICD-10-CM

## 2019-03-11 DIAGNOSIS — Z17.1 ESTROGEN RECEPTOR NEGATIVE TUMOR STATUS: ICD-10-CM

## 2019-03-11 PROCEDURE — 99214 PR OFFICE/OUTPT VISIT, EST, LEVL IV, 30-39 MIN: ICD-10-PCS | Mod: ,,, | Performed by: INTERNAL MEDICINE

## 2019-03-11 PROCEDURE — 1101F PR PT FALLS ASSESS DOC 0-1 FALLS W/OUT INJ PAST YR: ICD-10-PCS | Mod: ,,, | Performed by: INTERNAL MEDICINE

## 2019-03-11 PROCEDURE — 99214 OFFICE O/P EST MOD 30 MIN: CPT | Mod: ,,, | Performed by: INTERNAL MEDICINE

## 2019-03-11 PROCEDURE — 1101F PT FALLS ASSESS-DOCD LE1/YR: CPT | Mod: ,,, | Performed by: INTERNAL MEDICINE

## 2019-03-11 NOTE — LETTER
March 11, 2019      Manpreet Topete MD  [address] [facility]  [place] [address]           [facility] - Hematology Oncology  [address]  [address]  [place] [address]  Phone: [phone]  Fax: [phone]          Patient: Eugenia Darling   MR Number: [id number]   YOB: 1945   Date of Visit: 3/11/2019       Dear Dr. Manpreet Topete:    Thank you for referring Eugenia Darling to me for evaluation. Attached you will find relevant portions of my assessment and plan of care.    If you have questions, please do not hesitate to call me. I look forward to following Eugenia Darling along with you.    Sincerely,    Abel Calle MD    Enclosure  CC:  No Recipients    If you would like to receive this communication electronically, please contact [email or web address] or [phone] to request more information on [employer] Link access.    For providers and/or their staff who would like to refer a patient to [facility], please contact us through our one-stop-shop provider referral line, [facility], at [phone].    If you feel you have received this communication in error or would no longer like to receive these types of communications, please e-mail [email or web address]

## 2019-03-11 NOTE — PROGRESS NOTES
[facility] Hematology/Oncology  PROGRESS NOTE      Subjective:       Patient ID:   NAME: Eugenia Darling : 1945     73 y.o. female    Referring Doc: Yoselyn  Other Physicians: Tammi Fernandez, Jd    Chief Complaint:  Breast ca f/u    History of Present Illness:     Patient returns today for a regularly scheduled follow-up visit.  The patient is here by herself. She reports that she feels great.  She is doing ok with no new issues. She denies any CP, SOB, HA's or N/V. She had recent PEt scan on 2019.       ROS:   GEN: normal without any fever, night sweats or weight loss  HEENT: normal with no HA's, sore throat, stiff neck, changes in vision  CV: normal with no CP, SOB, PND, LEAVITT or orthopnea  PULM: normal with no SOB, cough, hemoptysis, sputum or pleuritic pain  GI:  no abdominal pain, nausea, vomiting, constipation,, melanotic stools, BRBPR, or hematemesis; diarrhea chronic resolved  : normal with no hematuria, dysuria  BREAST: normal with no mass, discharge, pain  SKIN: normal with no rash, erythema, bruising, or swelling    Allergies:  Review of patient's allergies indicates:   Allergen Reactions    Adhesive tape-silicones Rash       Medications:    Current Outpatient Medications:     aspirin 81 MG Chew, Take 81 mg by mouth once daily., Disp: , Rfl:     CALCIUM CARBONATE (CALCIUM 500 ORAL), Take 500 mg by mouth once daily., Disp: , Rfl:     krill oil 500 mg Cap, Take by mouth., Disp: , Rfl:     LACTOBACILLUS RHAMNOSUS GG (CULTURELLE ORAL), Take by mouth., Disp: , Rfl:     losartan (COZAAR) 100 MG tablet, Take 100 mg by mouth once daily., Disp: , Rfl:     metoprolol tartrate (LOPRESSOR) 25 MG tablet, Take 25 mg by mouth 2 (two) times daily., Disp: , Rfl:     multivitamin (ONE DAILY MULTIVITAMIN) per tablet, Take 1 tablet by mouth once daily., Disp: , Rfl:     simvastatin (ZOCOR) 10 MG tablet, Take 10 mg by mouth every evening., Disp: , Rfl:     turmeric root extract 500 mg Cap, Take by  mouth., Disp: , Rfl:     PMHx/PSHx Updates:  See patient's last visit with me on 11/5/2018  See H&P on Vol #1            Pathology:  See Vol #1          Objective:     Vitals:  Blood pressure 129/74, pulse 83, temperature 98.1 °F (36.7 °C), temperature source Oral, resp. rate 20, weight 66.9 kg (147 lb 6.4 oz).    Physical Examination:   GEN: no apparent distress, comfortable; AAOx3  HEAD: atraumatic and normocephalic  EYES: no pallor, no icterus, PERRLA  ENT: OMM, no pharyngeal erythema, external ears WNL; no nasal discharge; no thrush  NECK: no masses, thyroid normal, trachea midline, no LAD/LN's, supple  CV: RRR with no murmur; normal pulse; normal S1 and S2; no pedal edema  CHEST: Normal respiratory effort; CTAB; normal breath sounds; no wheeze or crackles  ABDOM: nontender and nondistended; soft; normal bowel sounds; no rebound/guarding  MUSC/Skeletal: ROM normal; no crepitus; joints normal; no deformities or arthropathy  EXTREM: no clubbing, cyanosis, inflammation or swelling  SKIN: no rashes, lesions, ulcers, petechiae or subcutaneous nodules  : no turner  NEURO: grossly intact; motor/sensory WNL; AAOx3; no tremors  PSYCH: normal mood, affect and behavior  LYMPH: normal cervical, supraclavicular, axillary and groin LN's  BREAST: no changes          Labs:     3/6/2018  Lab Results   Component Value Date    WBC 4.1 03/06/2019    HGB 13.1 03/06/2019    HCT 38.7 03/06/2019    MCV 93.3 03/06/2019     03/06/2019     BMP  Lab Results   Component Value Date     03/06/2019    K 4.5 03/06/2019     03/06/2019    CO2 30 03/06/2019    BUN 14 03/06/2019    CREATININE 0.64 03/06/2019    CALCIUM 9.6 03/06/2019    ESTGFRAFRICA 103 03/06/2019    EGFRNONAA 89 03/06/2019     Lab Results   Component Value Date    ALT 16 03/06/2019    AST 17 03/06/2019    ALKPHOS 62 03/06/2019    BILITOT 0.7 03/06/2019           Radiology/Diagnostic Studies:        PET  2/11/2019:    IMPRESSION:  Negative for recurrent  malignancy or metastatic disease.            Mammogram  10/30/2018:  IMPRESSION:    Changes related to previous lumpectomy and subsequent radiation therapy  posteriorly at the 12 o'clock position within the right breast.    No mammographic evidence of malignancy.      BI-RADS CATEGORY 2: BENIGN FINDING.      PET scan:   1/11/2018    IMPRESSION:    Negative for metastatic disease or residual/recurrent malignancy.          [facility] Unknown Rad Eap    Result Date: 10/30/2017  CMS MANDATED QUALITY DATA - MAMMOGRAPHY - 225 [facility] utilizes a reminder system to ensure that all patients receive reminder letters and/or direct phone calls for appointments. ?This includes ?reminders for routine screening mammograms, diagnostic mammograms, and other breast imaging interventions when appropriate. ?This patient will be placed in the?appropriate reminder system. BILATERAL DIGITAL DIAGNOSTIC MAMMOGRAM with CAD with tomosynthesis CLINICAL INFORMATION: Short-term follow-up following lumpectomy for right breast carcinoma. Technologist: Ina Vega. FINDINGS: Comparison made with prior mammograms dating back to 08/13/2014. The breasts are heterogeneously dense, which lowers the sensitivity of mammography. There is scarring in the right breast at 12 o'clock position from previous lumpectomy. In the rest of the breast, there are no other areas of architectural distortion and there are no masses or suspicious grouped calcifications.     IMPRESSION: There is no evidence of malignancy. Recommend routine screening. BI-RADS CATEGORY 1: NEGATIVE. Read and electronically signed by: Jonah Gu MD on 10/30/2017 9:29 AM CDT JONAH GU MD      I have reviewed all available lab results and radiology reports.    Assessment/Plan:     (1) 73 y.o. female with diagnosis of right breast cancer  - stage IIIA   Triple neg  - s/p right lumpectomy in Oct 2015 by Dr Cadena with 4 out of 13 LN's that were positive  - s/p AC,  taxol and XRT  - mammo 10/30/2018 was negative  - PET/CT 1/11/2018 is negative and repeat PET 2/11/2019 was also negative for recurrent disease  - repeat mammo due in Oct/Nov 2019        (2) HTN - followed by Dr Topete; BP good currently    (3) Chronic diarrhea issues - seen by Dr Miles - resolved - it was found to be due to the krill oil       1. Malignant neoplasm of upper-outer quadrant of right breast in female, estrogen receptor negative     2. Estrogen receptor negative tumor status     3. Anemia due to chemotherapy             PLAN:  1. Check up to date labs every 4 months  2. F/u with PCP, GYN, Gen Surg etc  3. RTC in 6 months  4. mammo next Oct 2019     Fax note to  Manpreet Topete MD, LENORE Packer, Jd and Tammi    Discussion:     I have explained all of the above in detail and the patient understands all of the current recommendation(s). I have answered all of their questions to the best of my ability and to their complete satisfaction.   The patient is to continue with the current management plan.            Electronically signed by Abel Calle MD

## 2019-09-04 ENCOUNTER — TELEPHONE (OUTPATIENT)
Dept: HEMATOLOGY/ONCOLOGY | Facility: CLINIC | Age: 74
End: 2019-09-04

## 2019-09-04 NOTE — TELEPHONE ENCOUNTER
Called to see if the pt had any labs done prior to f/u appt.    MARLEE for the labs to be done @ [facility]

## 2019-09-06 LAB
ALBUMIN SERPL-MCNC: 4.3 G/DL (ref 3.6–5.1)
ALBUMIN/GLOB SERPL: 1.5 (CALC) (ref 1–2.5)
ALP SERPL-CCNC: 64 U/L (ref 33–130)
ALT SERPL-CCNC: 23 U/L (ref 6–29)
AST SERPL-CCNC: 20 U/L (ref 10–35)
BASOPHILS # BLD AUTO: 18 CELLS/UL (ref 0–200)
BASOPHILS NFR BLD AUTO: 0.4 %
BILIRUB SERPL-MCNC: 0.8 MG/DL (ref 0.2–1.2)
BUN SERPL-MCNC: 17 MG/DL (ref 7–25)
BUN/CREAT SERPL: ABNORMAL (CALC) (ref 6–22)
CALCIUM SERPL-MCNC: 9.4 MG/DL (ref 8.6–10.4)
CHLORIDE SERPL-SCNC: 102 MMOL/L (ref 98–110)
CO2 SERPL-SCNC: 31 MMOL/L (ref 20–32)
CREAT SERPL-MCNC: 0.74 MG/DL (ref 0.6–0.93)
EOSINOPHIL # BLD AUTO: 180 CELLS/UL (ref 15–500)
EOSINOPHIL NFR BLD AUTO: 4 %
ERYTHROCYTE [DISTWIDTH] IN BLOOD BY AUTOMATED COUNT: 12 % (ref 11–15)
GFRSERPLBLD MDRD-ARVRAT: 80 ML/MIN/1.73M2
GLOBULIN SER CALC-MCNC: 2.9 G/DL (CALC) (ref 1.9–3.7)
GLUCOSE SERPL-MCNC: 103 MG/DL (ref 65–99)
HCT VFR BLD AUTO: 37.7 % (ref 35–45)
HGB BLD-MCNC: 12.7 G/DL (ref 11.7–15.5)
LYMPHOCYTES # BLD AUTO: 878 CELLS/UL (ref 850–3900)
LYMPHOCYTES NFR BLD AUTO: 19.5 %
MCH RBC QN AUTO: 32 PG (ref 27–33)
MCHC RBC AUTO-ENTMCNC: 33.7 G/DL (ref 32–36)
MCV RBC AUTO: 95 FL (ref 80–100)
MONOCYTES # BLD AUTO: 567 CELLS/UL (ref 200–950)
MONOCYTES NFR BLD AUTO: 12.6 %
NEUTROPHILS # BLD AUTO: 2858 CELLS/UL (ref 1500–7800)
NEUTROPHILS NFR BLD AUTO: 63.5 %
PLATELET # BLD AUTO: 174 THOUSAND/UL (ref 140–400)
PMV BLD REES-ECKER: 10.8 FL (ref 7.5–12.5)
POTASSIUM SERPL-SCNC: 4.7 MMOL/L (ref 3.5–5.3)
PROT SERPL-MCNC: 7.2 G/DL (ref 6.1–8.1)
RBC # BLD AUTO: 3.97 MILLION/UL (ref 3.8–5.1)
SODIUM SERPL-SCNC: 140 MMOL/L (ref 135–146)
WBC # BLD AUTO: 4.5 THOUSAND/UL (ref 3.8–10.8)

## 2019-09-06 NOTE — PROGRESS NOTES
[facility] Hematology/Oncology  PROGRESS NOTE      Subjective:       Patient ID:   NAME: Eugenia Darling : 1945     73 y.o. female    Referring Doc: Yoselyn  Other Physicians: Tammi Fernandez, Jd    Chief Complaint:  Breast ca f/u    History of Present Illness:     Patient returns today for a regularly scheduled follow-up visit.  The patient is here by herself. She reports that she feels great.  She is doing ok with no new issues. She denies any CP, SOB, HA's or N/V.  Mammogram is due in 2019      ROS:   GEN: normal without any fever, night sweats or weight loss  HEENT: normal with no HA's, sore throat, stiff neck, changes in vision  CV: normal with no CP, SOB, PND, LEAVITT or orthopnea  PULM: normal with no SOB, cough, hemoptysis, sputum or pleuritic pain  GI:  no abdominal pain, nausea, vomiting, constipation,, melanotic stools, BRBPR, or hematemesis; diarrhea chronic resolved  : normal with no hematuria, dysuria  BREAST: normal with no mass, discharge, pain  SKIN: normal with no rash, erythema, bruising, or swelling    Allergies:  Review of patient's allergies indicates:   Allergen Reactions    Adhesive tape-silicones Rash       Medications:    Current Outpatient Medications:     aspirin 81 MG Chew, Take 81 mg by mouth once daily., Disp: , Rfl:     CALCIUM CARBONATE (CALCIUM 500 ORAL), Take 500 mg by mouth once daily., Disp: , Rfl:     krill oil 500 mg Cap, Take by mouth., Disp: , Rfl:     LACTOBACILLUS RHAMNOSUS GG (CULTURELLE ORAL), Take by mouth., Disp: , Rfl:     losartan (COZAAR) 100 MG tablet, Take 100 mg by mouth once daily., Disp: , Rfl:     metoprolol tartrate (LOPRESSOR) 25 MG tablet, Take 25 mg by mouth 2 (two) times daily., Disp: , Rfl:     multivitamin (ONE DAILY MULTIVITAMIN) per tablet, Take 1 tablet by mouth once daily., Disp: , Rfl:     simvastatin (ZOCOR) 10 MG tablet, Take 10 mg by mouth every evening., Disp: , Rfl:     turmeric root extract 500 mg Cap, Take by mouth., Disp:  , Rfl:     PMHx/PSHx Updates:  See patient's last visit with me on 3/11/2019  See H&P on Vol #1            Pathology:  See Vol #1          Objective:     Vitals:  Blood pressure (!) 140/81, pulse 77, temperature 98.6 °F (37 °C), temperature source Oral, resp. rate 20, weight 69.2 kg (152 lb 8 oz).    Physical Examination:   GEN: no apparent distress, comfortable; AAOx3  HEAD: atraumatic and normocephalic  EYES: no pallor, no icterus, PERRLA  ENT: OMM, no pharyngeal erythema, external ears WNL; no nasal discharge; no thrush  NECK: no masses, thyroid normal, trachea midline, no LAD/LN's, supple  CV: RRR with no murmur; normal pulse; normal S1 and S2; no pedal edema  CHEST: Normal respiratory effort; CTAB; normal breath sounds; no wheeze or crackles  ABDOM: nontender and nondistended; soft; normal bowel sounds; no rebound/guarding  MUSC/Skeletal: ROM normal; no crepitus; joints normal; no deformities or arthropathy  EXTREM: no clubbing, cyanosis, inflammation or swelling  SKIN: no rashes, lesions, ulcers, petechiae or subcutaneous nodules  : no turner  NEURO: grossly intact; motor/sensory WNL; AAOx3; no tremors  PSYCH: normal mood, affect and behavior  LYMPH: normal cervical, supraclavicular, axillary and groin LN's  BREAST: no changes with prior lumpectomy scar stable          Labs:     9/5/2019  Lab Results   Component Value Date    WBC 4.5 09/05/2019    HGB 12.7 09/05/2019    HCT 37.7 09/05/2019    MCV 95.0 09/05/2019     09/05/2019     BMP  Lab Results   Component Value Date     09/05/2019    K 4.7 09/05/2019     09/05/2019    CO2 31 09/05/2019    BUN 17 09/05/2019    CREATININE 0.74 09/05/2019    CALCIUM 9.4 09/05/2019    ESTGFRAFRICA 93 09/05/2019    EGFRNONAA 80 09/05/2019     Lab Results   Component Value Date    ALT 23 09/05/2019    AST 20 09/05/2019    ALKPHOS 64 09/05/2019    BILITOT 0.8 09/05/2019           Radiology/Diagnostic Studies:        PET  2/11/2019:    IMPRESSION:  Negative for  recurrent malignancy or metastatic disease.            Mammogram  10/30/2018:  IMPRESSION:    Changes related to previous lumpectomy and subsequent radiation therapy  posteriorly at the 12 o'clock position within the right breast.    No mammographic evidence of malignancy.      BI-RADS CATEGORY 2: BENIGN FINDING.      PET scan:   1/11/2018    IMPRESSION:    Negative for metastatic disease or residual/recurrent malignancy.          [facility] Unknown Rad Eap    Result Date: 10/30/2017  CMS MANDATED QUALITY DATA - MAMMOGRAPHY - [facility] utilizes a reminder system to ensure that all patients receive reminder letters and/or direct phone calls for appointments. ?This includes ?reminders for routine screening mammograms, diagnostic mammograms, and other breast imaging interventions when appropriate. ?This patient will be placed in the?appropriate reminder system. BILATERAL DIGITAL DIAGNOSTIC MAMMOGRAM with CAD with tomosynthesis CLINICAL INFORMATION: Short-term follow-up following lumpectomy for right breast carcinoma. Technologist: Ina Vega. FINDINGS: Comparison made with prior mammograms dating back to 08/13/2014. The breasts are heterogeneously dense, which lowers the sensitivity of mammography. There is scarring in the right breast at 12 o'clock position from previous lumpectomy. In the rest of the breast, there are no other areas of architectural distortion and there are no masses or suspicious grouped calcifications.     IMPRESSION: There is no evidence of malignancy. Recommend routine screening. BI-RADS CATEGORY 1: NEGATIVE. Read and electronically signed by: Jonah Gu MD on 10/30/2017 9:29 AM CDT JONAH GU MD      I have reviewed all available lab results and radiology reports.    Assessment/Plan:     (1) 73 y.o. female with diagnosis of right breast cancer  - stage IIIA   Triple neg  - s/p right lumpectomy in Oct 2015 by Dr Cadena with 4 out of 13 LN's that were positive  -  s/p AC, taxol and XRT  - mammo 10/30/2018 was negative  - PET/CT 1/11/2018 is negative and repeat PET 2/11/2019 was also negative for recurrent disease  - repeat mammo due in Oct/Nov 2019        (2) HTN - followed by Dr Topete; BP good currently    (3) Chronic diarrhea issues - seen by Dr Miles - resolved - it was found to be due to the krill oil       1. Malignant neoplasm of upper-outer quadrant of right breast in female, estrogen receptor negative  CBC auto differential    Comprehensive metabolic panel    Mammo Digital Diagnostic Bilat    NM PET CT Routine Skull to Mid Thigh    CBC auto differential    Comprehensive metabolic panel   2. Estrogen receptor negative tumor status  CBC auto differential    Comprehensive metabolic panel    Mammo Digital Diagnostic Bilat    NM PET CT Routine Skull to Mid Thigh    CBC auto differential    Comprehensive metabolic panel   3. Anemia due to chemotherapy  CBC auto differential    Comprehensive metabolic panel    Mammo Digital Diagnostic Bilat    NM PET CT Routine Skull to Mid Thigh    CBC auto differential    Comprehensive metabolic panel           PLAN:  1. Check up to date labs every 4 months  2. F/u with PCP, GYN, Gen Surg etc  3. RTC in 6 months  4. mammo next Nov 2019  5. Repeat PEt in Feb 2020 if insurance permitting     Fax note to  Manpreet Topete MD, LENORE Packer, Jd and Tammi    Discussion:     I have explained all of the above in detail and the patient understands all of the current recommendation(s). I have answered all of their questions to the best of my ability and to their complete satisfaction.   The patient is to continue with the current management plan.            Electronically signed by Abel Calle MD

## 2019-09-09 ENCOUNTER — OFFICE VISIT (OUTPATIENT)
Dept: HEMATOLOGY/ONCOLOGY | Facility: CLINIC | Age: 74
End: 2019-09-09
Payer: MEDICARE

## 2019-09-09 VITALS
DIASTOLIC BLOOD PRESSURE: 81 MMHG | HEART RATE: 77 BPM | TEMPERATURE: 99 F | BODY MASS INDEX: 28.35 KG/M2 | SYSTOLIC BLOOD PRESSURE: 140 MMHG | WEIGHT: 152.5 LBS | RESPIRATION RATE: 20 BRPM

## 2019-09-09 DIAGNOSIS — D64.81 ANEMIA DUE TO CHEMOTHERAPY: ICD-10-CM

## 2019-09-09 DIAGNOSIS — C50.411 MALIGNANT NEOPLASM OF UPPER-OUTER QUADRANT OF RIGHT BREAST IN FEMALE, ESTROGEN RECEPTOR NEGATIVE: Primary | ICD-10-CM

## 2019-09-09 DIAGNOSIS — Z17.1 MALIGNANT NEOPLASM OF UPPER-OUTER QUADRANT OF RIGHT BREAST IN FEMALE, ESTROGEN RECEPTOR NEGATIVE: Primary | ICD-10-CM

## 2019-09-09 DIAGNOSIS — Z17.1 ESTROGEN RECEPTOR NEGATIVE TUMOR STATUS: ICD-10-CM

## 2019-09-09 DIAGNOSIS — T45.1X5A ANEMIA DUE TO CHEMOTHERAPY: ICD-10-CM

## 2019-09-09 PROCEDURE — 1101F PR PT FALLS ASSESS DOC 0-1 FALLS W/OUT INJ PAST YR: ICD-10-PCS | Mod: S$GLB,,, | Performed by: INTERNAL MEDICINE

## 2019-09-09 PROCEDURE — 1101F PT FALLS ASSESS-DOCD LE1/YR: CPT | Mod: S$GLB,,, | Performed by: INTERNAL MEDICINE

## 2019-09-09 PROCEDURE — 99214 OFFICE O/P EST MOD 30 MIN: CPT | Mod: S$GLB,,, | Performed by: INTERNAL MEDICINE

## 2019-09-09 PROCEDURE — 99214 PR OFFICE/OUTPT VISIT, EST, LEVL IV, 30-39 MIN: ICD-10-PCS | Mod: S$GLB,,, | Performed by: INTERNAL MEDICINE

## 2019-10-07 ENCOUNTER — TELEPHONE (OUTPATIENT)
Dept: HEMATOLOGY/ONCOLOGY | Facility: CLINIC | Age: 74
End: 2019-10-07

## 2019-10-07 DIAGNOSIS — C50.411 MALIGNANT NEOPLASM OF UPPER-OUTER QUADRANT OF RIGHT BREAST IN FEMALE, ESTROGEN RECEPTOR NEGATIVE: Primary | ICD-10-CM

## 2019-10-07 DIAGNOSIS — Z17.1 ESTROGEN RECEPTOR NEGATIVE TUMOR STATUS: ICD-10-CM

## 2019-10-07 DIAGNOSIS — Z17.1 MALIGNANT NEOPLASM OF UPPER-OUTER QUADRANT OF RIGHT BREAST IN FEMALE, ESTROGEN RECEPTOR NEGATIVE: Primary | ICD-10-CM

## 2019-10-07 NOTE — TELEPHONE ENCOUNTER
----- Message from Celsa Calle sent at 10/7/2019 11:41 AM CDT -----  Regarding: BREAST ULTRASOUND ORDER  Please submit an order for Breast Ultrasound with all Diagnostic Mammogram orders.    Thanks,

## 2019-11-15 ENCOUNTER — TELEPHONE (OUTPATIENT)
Dept: HEMATOLOGY/ONCOLOGY | Facility: CLINIC | Age: 74
End: 2019-11-15

## 2019-11-15 ENCOUNTER — HOSPITAL ENCOUNTER (OUTPATIENT)
Dept: RADIOLOGY | Facility: HOSPITAL | Age: 74
Discharge: HOME | End: 2019-11-15
Attending: INTERNAL MEDICINE
Payer: MEDICARE

## 2019-11-15 VITALS — WEIGHT: 152.56 LBS | HEIGHT: 62 IN | BODY MASS INDEX: 28.07 KG/M2

## 2019-11-15 DIAGNOSIS — Z17.1 MALIGNANT NEOPLASM OF UPPER-OUTER QUADRANT OF RIGHT BREAST IN FEMALE, ESTROGEN RECEPTOR NEGATIVE: ICD-10-CM

## 2019-11-15 DIAGNOSIS — C50.411 MALIGNANT NEOPLASM OF UPPER-OUTER QUADRANT OF RIGHT BREAST IN FEMALE, ESTROGEN RECEPTOR NEGATIVE: ICD-10-CM

## 2019-11-15 DIAGNOSIS — Z17.1 ESTROGEN RECEPTOR NEGATIVE TUMOR STATUS: ICD-10-CM

## 2019-11-15 DIAGNOSIS — D64.81 ANEMIA DUE TO CHEMOTHERAPY: ICD-10-CM

## 2019-11-15 DIAGNOSIS — T45.1X5A ANEMIA DUE TO CHEMOTHERAPY: ICD-10-CM

## 2019-11-15 PROCEDURE — 77066 DX MAMMO INCL CAD BI: CPT | Mod: TC,PO

## 2019-11-25 DIAGNOSIS — M85.88 OTHER SPECIFIED DISORDERS OF BONE DENSITY AND STRUCTURE, OTHER SITE: Primary | ICD-10-CM

## 2019-12-09 ENCOUNTER — HOSPITAL ENCOUNTER (OUTPATIENT)
Dept: RADIOLOGY | Facility: HOSPITAL | Age: 74
Discharge: HOME | End: 2019-12-09
Attending: OBSTETRICS & GYNECOLOGY
Payer: MEDICARE

## 2019-12-09 DIAGNOSIS — M85.88 OTHER SPECIFIED DISORDERS OF BONE DENSITY AND STRUCTURE, OTHER SITE: ICD-10-CM

## 2019-12-09 PROCEDURE — 77080 DXA BONE DENSITY AXIAL: CPT | Mod: TC,PO

## 2020-02-27 ENCOUNTER — TELEPHONE (OUTPATIENT)
Dept: HEMATOLOGY/ONCOLOGY | Facility: CLINIC | Age: 75
End: 2020-02-27

## 2020-02-27 NOTE — TELEPHONE ENCOUNTER
Called the patient and left a message for her to return my call if needed, but I left her lab orders at the .

## 2020-02-27 NOTE — TELEPHONE ENCOUNTER
----- Message from Eileen Garcia MA sent at 2/24/2020 11:46 AM CST -----  Regarding: blood work order  Contact: [phone]  Patient would like to come get a copy of her blood work order for [facility]. She seems to have misplaced her orders.     # [phone]    Thank you    Eileen

## 2020-03-02 ENCOUNTER — HOSPITAL ENCOUNTER (OUTPATIENT)
Dept: RADIOLOGY | Facility: HOSPITAL | Age: 75
Discharge: HOME | End: 2020-03-02
Attending: INTERNAL MEDICINE
Payer: MEDICARE

## 2020-03-02 VITALS — HEIGHT: 61 IN | WEIGHT: 149 LBS | BODY MASS INDEX: 28.13 KG/M2

## 2020-03-02 DIAGNOSIS — C50.411 MALIGNANT NEOPLASM OF UPPER-OUTER QUADRANT OF RIGHT BREAST IN FEMALE, ESTROGEN RECEPTOR NEGATIVE: ICD-10-CM

## 2020-03-02 DIAGNOSIS — Z17.1 ESTROGEN RECEPTOR NEGATIVE TUMOR STATUS: ICD-10-CM

## 2020-03-02 DIAGNOSIS — T45.1X5A ANEMIA DUE TO CHEMOTHERAPY: ICD-10-CM

## 2020-03-02 DIAGNOSIS — D64.81 ANEMIA DUE TO CHEMOTHERAPY: ICD-10-CM

## 2020-03-02 DIAGNOSIS — Z17.1 MALIGNANT NEOPLASM OF UPPER-OUTER QUADRANT OF RIGHT BREAST IN FEMALE, ESTROGEN RECEPTOR NEGATIVE: ICD-10-CM

## 2020-03-02 LAB — GLUCOSE SERPL-MCNC: 102 MG/DL (ref 70–110)

## 2020-03-02 PROCEDURE — A9552 F18 FDG: HCPCS | Mod: PO

## 2020-03-02 PROCEDURE — 78815 PET IMAGE W/CT SKULL-THIGH: CPT | Mod: TC,PO

## 2020-03-03 ENCOUNTER — TELEPHONE (OUTPATIENT)
Dept: HEMATOLOGY/ONCOLOGY | Facility: CLINIC | Age: 75
End: 2020-03-03

## 2020-03-03 NOTE — TELEPHONE ENCOUNTER
----- Message from Abel Calle MD sent at 3/2/2020  4:40 PM CST -----  cll her with the good report

## 2020-03-05 ENCOUNTER — TELEPHONE (OUTPATIENT)
Dept: HEMATOLOGY/ONCOLOGY | Facility: CLINIC | Age: 75
End: 2020-03-05

## 2020-03-05 NOTE — TELEPHONE ENCOUNTER
Called patient to see if she completed blood work. Left message and also reminded patient of appointment Monday at 930.

## 2020-03-09 ENCOUNTER — OFFICE VISIT (OUTPATIENT)
Dept: HEMATOLOGY/ONCOLOGY | Facility: CLINIC | Age: 75
End: 2020-03-09
Payer: MEDICARE

## 2020-03-09 ENCOUNTER — TELEPHONE (OUTPATIENT)
Dept: HEMATOLOGY/ONCOLOGY | Facility: CLINIC | Age: 75
End: 2020-03-09

## 2020-03-09 VITALS
RESPIRATION RATE: 16 BRPM | DIASTOLIC BLOOD PRESSURE: 82 MMHG | WEIGHT: 155.5 LBS | SYSTOLIC BLOOD PRESSURE: 135 MMHG | BODY MASS INDEX: 29.38 KG/M2 | HEART RATE: 73 BPM | TEMPERATURE: 98 F

## 2020-03-09 DIAGNOSIS — D64.81 ANEMIA DUE TO CHEMOTHERAPY: ICD-10-CM

## 2020-03-09 DIAGNOSIS — Z17.1 ESTROGEN RECEPTOR NEGATIVE TUMOR STATUS: ICD-10-CM

## 2020-03-09 DIAGNOSIS — Z17.1 MALIGNANT NEOPLASM OF UPPER-OUTER QUADRANT OF RIGHT BREAST IN FEMALE, ESTROGEN RECEPTOR NEGATIVE: Primary | ICD-10-CM

## 2020-03-09 DIAGNOSIS — T45.1X5A ANEMIA DUE TO CHEMOTHERAPY: ICD-10-CM

## 2020-03-09 DIAGNOSIS — C50.411 MALIGNANT NEOPLASM OF UPPER-OUTER QUADRANT OF RIGHT BREAST IN FEMALE, ESTROGEN RECEPTOR NEGATIVE: Primary | ICD-10-CM

## 2020-03-09 PROCEDURE — 1159F PR MEDICATION LIST DOCUMENTED IN MEDICAL RECORD: ICD-10-PCS | Mod: S$GLB,,, | Performed by: INTERNAL MEDICINE

## 2020-03-09 PROCEDURE — 1126F AMNT PAIN NOTED NONE PRSNT: CPT | Mod: S$GLB,,, | Performed by: INTERNAL MEDICINE

## 2020-03-09 PROCEDURE — 1101F PT FALLS ASSESS-DOCD LE1/YR: CPT | Mod: S$GLB,,, | Performed by: INTERNAL MEDICINE

## 2020-03-09 PROCEDURE — 99214 OFFICE O/P EST MOD 30 MIN: CPT | Mod: S$GLB,,, | Performed by: INTERNAL MEDICINE

## 2020-03-09 PROCEDURE — 99214 PR OFFICE/OUTPT VISIT, EST, LEVL IV, 30-39 MIN: ICD-10-PCS | Mod: S$GLB,,, | Performed by: INTERNAL MEDICINE

## 2020-03-09 PROCEDURE — 1159F MED LIST DOCD IN RCRD: CPT | Mod: S$GLB,,, | Performed by: INTERNAL MEDICINE

## 2020-03-09 PROCEDURE — 1101F PR PT FALLS ASSESS DOC 0-1 FALLS W/OUT INJ PAST YR: ICD-10-PCS | Mod: S$GLB,,, | Performed by: INTERNAL MEDICINE

## 2020-03-09 PROCEDURE — 1126F PR PAIN SEVERITY QUANTIFIED, NO PAIN PRESENT: ICD-10-PCS | Mod: S$GLB,,, | Performed by: INTERNAL MEDICINE

## 2020-03-09 NOTE — PROGRESS NOTES
"   [facility] Hematology/Oncology  PROGRESS NOTE      Subjective:       Patient ID:   NAME: Eugenia Darling : 1945     74 y.o. female    Referring Doc: Yoselyn  Other Physicians: Tammi Fernandez, Jd    Chief Complaint:  Breast ca f/u    History of Present Illness:     Patient returns today for a regularly scheduled follow-up visit.  The patient is here with her .  She reports that she feels "great".  She is doing ok with no new issues. She denies any CP, SOB, HA's or N/V.  She had recent PEt on 3/2 and had a mammogram in 2019     ROS:   GEN: normal without any fever, night sweats or weight loss  HEENT: normal with no HA's, sore throat, stiff neck, changes in vision  CV: normal with no CP, SOB, PND, LEAVITT or orthopnea  PULM: normal with no SOB, cough, hemoptysis, sputum or pleuritic pain  GI:  no abdominal pain, nausea, vomiting, constipation,, melanotic stools, BRBPR, or hematemesis; diarrhea chronic resolved  : normal with no hematuria, dysuria  BREAST: normal with no mass, discharge, pain  SKIN: normal with no rash, erythema, bruising, or swelling    Allergies:  Review of patient's allergies indicates:   Allergen Reactions    Adhesive tape-silicones Rash       Medications:    Current Outpatient Medications:     aspirin 81 MG Chew, Take 81 mg by mouth once daily., Disp: , Rfl:     CALCIUM CARBONATE (CALCIUM 500 ORAL), Take 500 mg by mouth once daily., Disp: , Rfl:     metoprolol tartrate (LOPRESSOR) 25 MG tablet, Take 25 mg by mouth 2 (two) times daily., Disp: , Rfl:     multivitamin (ONE DAILY MULTIVITAMIN) per tablet, Take 1 tablet by mouth once daily., Disp: , Rfl:     simvastatin (ZOCOR) 10 MG tablet, Take 10 mg by mouth every evening., Disp: , Rfl:     turmeric root extract 500 mg Cap, Take by mouth., Disp: , Rfl:     krill oil 500 mg Cap, Take by mouth., Disp: , Rfl:     LACTOBACILLUS RHAMNOSUS GG (CULTURELLE ORAL), Take by mouth., Disp: , Rfl:     losartan (COZAAR) 100 MG tablet, " Take 100 mg by mouth once daily., Disp: , Rfl:     PMHx/PSHx Updates:  See patient's last visit with me on 9/9/2019  See H&P on Vol #1            Pathology:  See Vol #1          Objective:     Vitals:  Blood pressure 135/82, pulse 73, temperature 97.8 °F (36.6 °C), temperature source Oral, resp. rate 16, weight 70.5 kg (155 lb 8 oz).    Physical Examination:   GEN: no apparent distress, comfortable; AAOx3  HEAD: atraumatic and normocephalic  EYES: no pallor, no icterus, PERRLA  ENT: OMM, no pharyngeal erythema, external ears WNL; no nasal discharge; no thrush  NECK: no masses, thyroid normal, trachea midline, no LAD/LN's, supple  CV: RRR with no murmur; normal pulse; normal S1 and S2; no pedal edema  CHEST: Normal respiratory effort; CTAB; normal breath sounds; no wheeze or crackles  ABDOM: nontender and nondistended; soft; normal bowel sounds; no rebound/guarding  MUSC/Skeletal: ROM normal; no crepitus; joints normal; no deformities or arthropathy  EXTREM: no clubbing, cyanosis, inflammation or swelling  SKIN: no rashes, lesions, ulcers, petechiae or subcutaneous nodules  : no turner  NEURO: grossly intact; motor/sensory WNL; AAOx3; no tremors  PSYCH: normal mood, affect and behavior  LYMPH: normal cervical, supraclavicular, axillary and groin LN's  BREAST: no changes with prior lumpectomy scar stable          Labs:     3/2/2020  Lab Results   Component Value Date    WBC 5.1 03/05/2020    HGB 12.5 03/05/2020    HCT 37.8 03/05/2020    MCV 94.0 03/05/2020     03/05/2020     BMP  Lab Results   Component Value Date     03/05/2020    K 4.2 03/05/2020     03/05/2020    CO2 32 03/05/2020    BUN 20 03/05/2020    CREATININE 0.69 03/05/2020    CALCIUM 9.5 03/05/2020    ESTGFRAFRICA 99 03/05/2020    EGFRNONAA 86 03/05/2020     Lab Results   Component Value Date    ALT 15 03/05/2020    AST 14 03/05/2020    ALKPHOS 58 03/05/2020    BILITOT 0.6 03/05/2020           Radiology/Diagnostic Studies:        PET   3/2/2020:      Impression       No evidence of FDG avid residual, recurrent or metastatic malignancy         Mammo 11/15/2019:    Impression:     No change from prior examination. No mammographic evidence of malignancy.     TECHNOLOGIST: Mirtha Hairston     BI-RADS CATEGORY 2- BENIGN     RECOMMENDATION: ANNUAL SCGREENING MAMMOGRAM              PET  2/11/2019:    IMPRESSION:  Negative for recurrent malignancy or metastatic disease.            Mammogram  10/30/2018:  IMPRESSION:    Changes related to previous lumpectomy and subsequent radiation therapy  posteriorly at the 12 o'clock position within the right breast.    No mammographic evidence of malignancy.      BI-RADS CATEGORY 2: BENIGN FINDING.      PET scan:   1/11/2018    IMPRESSION:    Negative for metastatic disease or residual/recurrent malignancy.          [facility] Unknown Rad Eap    Result Date: 10/30/2017  CMS MANDATED QUALITY DATA - MAMMOGRAPHY - [facility] utilizes a reminder system to ensure that all patients receive reminder letters and/or direct phone calls for appointments. ?This includes ?reminders for routine screening mammograms, diagnostic mammograms, and other breast imaging interventions when appropriate. ?This patient will be placed in the?appropriate reminder system. BILATERAL DIGITAL DIAGNOSTIC MAMMOGRAM with CAD with tomosynthesis CLINICAL INFORMATION: Short-term follow-up following lumpectomy for right breast carcinoma. Technologist: Ina Vega. FINDINGS: Comparison made with prior mammograms dating back to 08/13/2014. The breasts are heterogeneously dense, which lowers the sensitivity of mammography. There is scarring in the right breast at 12 o'clock position from previous lumpectomy. In the rest of the breast, there are no other areas of architectural distortion and there are no masses or suspicious grouped calcifications.     IMPRESSION: There is no evidence of malignancy. Recommend routine screening. BI-RADS  CATEGORY 1: NEGATIVE. Read and electronically signed by: Jonah Gu MD on 10/30/2017 9:29 AM CDT JONAH GU MD      I have reviewed all available lab results and radiology reports.    Assessment/Plan:     (1) 74 y.o. female with diagnosis of right breast cancer  - stage IIIA   Triple neg  - s/p right lumpectomy in Oct 2015 by Dr Cadena with 4 out of 13 LN's that were positive  - s/p AC, taxol and XRT  - mammo 10/30/2018 was negative  - PET/CT 1/11/2018 is negative and repeat PET 2/11/2019 was also negative for recurrent disease  - recent PEt on 3/2/2020 negative for any recurrence  - repeat mammo due in  Nov 2020        (2) HTN - followed by Dr Topete; BP good currently    (3) Chronic diarrhea issues - seen by Dr Miles - resolved - it was found to be due to the krill oil       1. Malignant neoplasm of upper-outer quadrant of right breast in female, estrogen receptor negative     2. Estrogen receptor negative tumor status     3. Anemia due to chemotherapy             PLAN:  1. Check up to date labs every 6 months  2. F/u with PCP, GYN, Gen Surg etc  3. RTC in 6 months  4. mammo next Nov 2020  5. Repeat PEt in Feb 2021 if insurance permitting     Fax note to  Manpreet Topete MD, LENORE Packer, Jd and Tammi    Discussion:     I have explained all of the above in detail and the patient understands all of the current recommendation(s). I have answered all of their questions to the best of my ability and to their complete satisfaction.   The patient is to continue with the current management plan.            Electronically signed by Abel Calle MD

## 2020-03-09 NOTE — TELEPHONE ENCOUNTER
----- Message from Madison Tolbert sent at 3/9/2020  9:52 AM CDT -----  The patient needs lab orders in the system and would like to have a copy of the orders mailed to her.

## 2020-09-13 NOTE — PROGRESS NOTES
"   [facility] Hematology/Oncology  PROGRESS NOTE      Subjective:       Patient ID:   NAME: Eugenia Darling : 1945     74 y.o. female    Referring Doc: Yoselyn  Other Physicians: Tammi Fernandez, Jd    Chief Complaint:  Breast ca f/u    History of Present Illness:     Patient returns today for a regularly scheduled follow-up visit.  The patient is here with her .  She reports that she feels "good".  Her  suddenly passed away from a cerebral hemorrhage about 2 weeks ago. She is doing ok with no new issues. She denies any CP, SOB, HA's or N/V.  She had prior PEt on 3/2 and had a mammogram in 2019     Discussed covid19 precautions    ROS:   GEN: normal without any fever, night sweats or weight loss  HEENT: normal with no HA's, sore throat, stiff neck, changes in vision  CV: normal with no CP, SOB, PND, LEAVITT or orthopnea  PULM: normal with no SOB, cough, hemoptysis, sputum or pleuritic pain  GI:  no abdominal pain, nausea, vomiting, constipation,, melanotic stools, BRBPR, or hematemesis; diarrhea chronic resolved  : normal with no hematuria, dysuria  BREAST: normal with no mass, discharge, pain  SKIN: normal with no rash, erythema, bruising, or swelling    Allergies:  Review of patient's allergies indicates:   Allergen Reactions    Adhesive tape-silicones Rash       Medications:    Current Outpatient Medications:     aspirin 81 MG Chew, Take 81 mg by mouth once daily., Disp: , Rfl:     CALCIUM CARBONATE (CALCIUM 500 ORAL), Take 500 mg by mouth once daily., Disp: , Rfl:     krill oil 500 mg Cap, Take by mouth., Disp: , Rfl:     LACTOBACILLUS RHAMNOSUS GG (CULTURELLE ORAL), Take by mouth., Disp: , Rfl:     losartan (COZAAR) 100 MG tablet, Take 100 mg by mouth once daily., Disp: , Rfl:     metoprolol tartrate (LOPRESSOR) 25 MG tablet, Take 25 mg by mouth 2 (two) times daily., Disp: , Rfl:     multivitamin (ONE DAILY MULTIVITAMIN) per tablet, Take 1 tablet by mouth once daily., Disp: , Rfl: "     simvastatin (ZOCOR) 10 MG tablet, Take 10 mg by mouth every evening., Disp: , Rfl:     turmeric root extract 500 mg Cap, Take by mouth., Disp: , Rfl:     PMHx/PSHx Updates:  See patient's last visit with me on 3/9/2020  See H&P on Vol #1            Pathology:  See Vol #1          Objective:     Vitals:  Blood pressure (!) 149/80, pulse 93, temperature 97.9 °F (36.6 °C), resp. rate 18, weight 64.6 kg (142 lb 8 oz).    Physical Examination:   GEN: no apparent distress, comfortable; AAOx3  HEAD: atraumatic and normocephalic  EYES: no pallor, no icterus, PERRLA  ENT: OMM, no pharyngeal erythema, external ears WNL; no nasal discharge; no thrush  NECK: no masses, thyroid normal, trachea midline, no LAD/LN's, supple  CV: RRR with no murmur; normal pulse; normal S1 and S2; no pedal edema  CHEST: Normal respiratory effort; CTAB; normal breath sounds; no wheeze or crackles  ABDOM: nontender and nondistended; soft; normal bowel sounds; no rebound/guarding  MUSC/Skeletal: ROM normal; no crepitus; joints normal; no deformities or arthropathy  EXTREM: no clubbing, cyanosis, inflammation or swelling  SKIN: no rashes, lesions, ulcers, petechiae or subcutaneous nodules  : no turner  NEURO: grossly intact; motor/sensory WNL; AAOx3; no tremors  PSYCH: normal mood, affect and behavior  LYMPH: normal cervical, supraclavicular, axillary and groin LN's  BREAST: no changes with prior lumpectomy scar stable          Labs:        Lab Results   Component Value Date    WBC 5.2 09/09/2020    HGB 12.7 09/09/2020    HCT 39.3 09/09/2020    MCV 95.4 09/09/2020     09/09/2020     BMP  Lab Results   Component Value Date     09/09/2020    K 4.4 09/09/2020     09/09/2020    CO2 28 09/09/2020    BUN 13 09/09/2020    CREATININE 0.80 09/09/2020    CALCIUM 9.9 09/09/2020    ESTGFRAFRICA 84 09/09/2020    EGFRNONAA 73 09/09/2020     Lab Results   Component Value Date    ALT 16 09/09/2020    AST 16 09/09/2020    ALKPHOS 58 03/05/2020     BILITOT 0.7 09/09/2020           Radiology/Diagnostic Studies:        PET  3/2/2020:      Impression       No evidence of FDG avid residual, recurrent or metastatic malignancy         Mammo 11/15/2019:    Impression:     No change from prior examination. No mammographic evidence of malignancy.     TECHNOLOGIST: Mirtha Hairston     BI-RADS CATEGORY 2- BENIGN     RECOMMENDATION: ANNUAL SCGREENING MAMMOGRAM              PET  2/11/2019:    IMPRESSION:  Negative for recurrent malignancy or metastatic disease.            Mammogram  10/30/2018:  IMPRESSION:    Changes related to previous lumpectomy and subsequent radiation therapy  posteriorly at the 12 o'clock position within the right breast.    No mammographic evidence of malignancy.      BI-RADS CATEGORY 2: BENIGN FINDING.      PET scan:   1/11/2018    IMPRESSION:    Negative for metastatic disease or residual/recurrent malignancy.          [facility] Unknown Rad Eap    Result Date: 10/30/2017  CMS MANDATED QUALITY DATA - MAMMOGRAPHY - [facility] utilizes a reminder system to ensure that all patients receive reminder letters and/or direct phone calls for appointments. ?This includes ?reminders for routine screening mammograms, diagnostic mammograms, and other breast imaging interventions when appropriate. ?This patient will be placed in the?appropriate reminder system. BILATERAL DIGITAL DIAGNOSTIC MAMMOGRAM with CAD with tomosynthesis CLINICAL INFORMATION: Short-term follow-up following lumpectomy for right breast carcinoma. Technologist: Ina Vega. FINDINGS: Comparison made with prior mammograms dating back to 08/13/2014. The breasts are heterogeneously dense, which lowers the sensitivity of mammography. There is scarring in the right breast at 12 o'clock position from previous lumpectomy. In the rest of the breast, there are no other areas of architectural distortion and there are no masses or suspicious grouped calcifications.     IMPRESSION:  There is no evidence of malignancy. Recommend routine screening. BI-RADS CATEGORY 1: NEGATIVE. Read and electronically signed by: Jonah Gu MD on 10/30/2017 9:29 AM CDT JONAH GU MD      I have reviewed all available lab results and radiology reports.    Assessment/Plan:     (1) 74 y.o. female with diagnosis of right breast cancer  - stage IIIA   Triple neg  - s/p right lumpectomy in Oct 2015 by Dr Cadena with 4 out of 13 LN's that were positive  - s/p AC, taxol and XRT  - mammo 10/30/2018 was negative  - PET/CT 1/11/2018 is negative and repeat PET 2/11/2019 was also negative for recurrent disease  - recent PEt on 3/2/2020 negative for any recurrence  - repeat mammo due in  Nov 2020        (2) HTN - followed by Dr Topete; BP good currently    (3) Chronic diarrhea issues - seen by Dr Miles - resolved - it was found to be due to the krill oil       1. Malignant neoplasm of upper-outer quadrant of right breast in female, estrogen receptor negative     2. Estrogen receptor negative tumor status     3. Anemia due to chemotherapy             PLAN:  1. Check up to date labs every 6 months  2. F/u with PCP, GYN, Gen Surg etc  3. RTC in 12 months  4. mammo next Nov 2020  5. Repeat PEt in Feb 2021 if insurance permitting     Fax note to  Manpreet Topete MD, LENORE Packer, Jd and Tammi    Discussion:     COVID-19 Discussion:    I had long discussion with patient and any applicable family about the COVID-19 coronavirus epidemic and the recommended precautions with regard to cancer and/or hematology patients. I have re-iterated the CDC recommendations for adequate hand washing, use of hand -like products, and coughing into elbow, etc. In addition, especially for our patients who are on chemotherapy and/or our otherwise immunocompromised patients, I have recommended avoidance of crowds, including movie theaters, restaurants, churches, etc. I have recommended avoidance of any sick or  symptomatic family members and/or friends. I have also recommended avoidance of any raw and unwashed food products, and general avoidance of food items that have not been prepared by themselves. The patient has been asked to call us immediately with any symptom developments, issues, questions or other general concerns.     I have explained all of the above in detail and the patient understands all of the current recommendation(s). I have answered all of their questions to the best of my ability and to their complete satisfaction.   The patient is to continue with the current management plan.            Electronically signed by Abel Calle MD

## 2020-09-14 ENCOUNTER — OFFICE VISIT (OUTPATIENT)
Dept: HEMATOLOGY/ONCOLOGY | Facility: CLINIC | Age: 75
End: 2020-09-14
Payer: MEDICARE

## 2020-09-14 VITALS
TEMPERATURE: 98 F | HEART RATE: 93 BPM | DIASTOLIC BLOOD PRESSURE: 80 MMHG | SYSTOLIC BLOOD PRESSURE: 149 MMHG | BODY MASS INDEX: 26.93 KG/M2 | RESPIRATION RATE: 18 BRPM | WEIGHT: 142.5 LBS

## 2020-09-14 DIAGNOSIS — T45.1X5A ANEMIA DUE TO CHEMOTHERAPY: ICD-10-CM

## 2020-09-14 DIAGNOSIS — D64.81 ANEMIA DUE TO CHEMOTHERAPY: ICD-10-CM

## 2020-09-14 DIAGNOSIS — C50.411 MALIGNANT NEOPLASM OF UPPER-OUTER QUADRANT OF RIGHT BREAST IN FEMALE, ESTROGEN RECEPTOR NEGATIVE: Primary | ICD-10-CM

## 2020-09-14 DIAGNOSIS — Z17.1 MALIGNANT NEOPLASM OF UPPER-OUTER QUADRANT OF RIGHT BREAST IN FEMALE, ESTROGEN RECEPTOR NEGATIVE: Primary | ICD-10-CM

## 2020-09-14 DIAGNOSIS — Z17.1 ESTROGEN RECEPTOR NEGATIVE TUMOR STATUS: ICD-10-CM

## 2020-09-14 PROCEDURE — 1101F PT FALLS ASSESS-DOCD LE1/YR: CPT | Mod: S$GLB,,, | Performed by: INTERNAL MEDICINE

## 2020-09-14 PROCEDURE — 3008F PR BODY MASS INDEX (BMI) DOCUMENTED: ICD-10-PCS | Mod: S$GLB,,, | Performed by: INTERNAL MEDICINE

## 2020-09-14 PROCEDURE — 1159F MED LIST DOCD IN RCRD: CPT | Mod: S$GLB,,, | Performed by: INTERNAL MEDICINE

## 2020-09-14 PROCEDURE — 1159F PR MEDICATION LIST DOCUMENTED IN MEDICAL RECORD: ICD-10-PCS | Mod: S$GLB,,, | Performed by: INTERNAL MEDICINE

## 2020-09-14 PROCEDURE — 1101F PR PT FALLS ASSESS DOC 0-1 FALLS W/OUT INJ PAST YR: ICD-10-PCS | Mod: S$GLB,,, | Performed by: INTERNAL MEDICINE

## 2020-09-14 PROCEDURE — 1126F AMNT PAIN NOTED NONE PRSNT: CPT | Mod: S$GLB,,, | Performed by: INTERNAL MEDICINE

## 2020-09-14 PROCEDURE — 3008F BODY MASS INDEX DOCD: CPT | Mod: S$GLB,,, | Performed by: INTERNAL MEDICINE

## 2020-09-14 PROCEDURE — 1126F PR PAIN SEVERITY QUANTIFIED, NO PAIN PRESENT: ICD-10-PCS | Mod: S$GLB,,, | Performed by: INTERNAL MEDICINE

## 2020-09-14 PROCEDURE — 99214 PR OFFICE/OUTPT VISIT, EST, LEVL IV, 30-39 MIN: ICD-10-PCS | Mod: S$GLB,,, | Performed by: INTERNAL MEDICINE

## 2020-09-14 PROCEDURE — 99214 OFFICE O/P EST MOD 30 MIN: CPT | Mod: S$GLB,,, | Performed by: INTERNAL MEDICINE

## 2020-09-14 RX ORDER — IRBESARTAN 150 MG/1
150 TABLET ORAL NIGHTLY
COMMUNITY

## 2020-10-20 DIAGNOSIS — C50.411 MALIGNANT NEOPLASM OF UPPER-OUTER QUADRANT OF RIGHT FEMALE BREAST: Primary | ICD-10-CM

## 2020-10-20 DIAGNOSIS — Z17.1 ESTROGEN RECEPTOR NEGATIVE STATUS (ER-): ICD-10-CM

## 2020-11-16 ENCOUNTER — HOSPITAL ENCOUNTER (OUTPATIENT)
Dept: RADIOLOGY | Facility: HOSPITAL | Age: 75
Discharge: HOME | End: 2020-11-16
Attending: INTERNAL MEDICINE
Payer: MEDICARE

## 2020-11-16 DIAGNOSIS — Z17.1 MALIGNANT NEOPLASM OF UPPER-OUTER QUADRANT OF RIGHT BREAST IN FEMALE, ESTROGEN RECEPTOR NEGATIVE: ICD-10-CM

## 2020-11-16 DIAGNOSIS — C50.411 MALIGNANT NEOPLASM OF UPPER-OUTER QUADRANT OF RIGHT BREAST IN FEMALE, ESTROGEN RECEPTOR NEGATIVE: ICD-10-CM

## 2020-11-16 DIAGNOSIS — Z17.1 ESTROGEN RECEPTOR NEGATIVE TUMOR STATUS: ICD-10-CM

## 2020-11-16 DIAGNOSIS — C50.411 MALIGNANT NEOPLASM OF UPPER-OUTER QUADRANT OF RIGHT FEMALE BREAST: ICD-10-CM

## 2020-11-16 DIAGNOSIS — Z17.1 ESTROGEN RECEPTOR NEGATIVE STATUS (ER-): ICD-10-CM

## 2020-11-16 PROCEDURE — 77062 BREAST TOMOSYNTHESIS BI: CPT | Mod: TC,PO

## 2021-01-12 ENCOUNTER — IMMUNIZATION (OUTPATIENT)
Dept: PRIMARY CARE CLINIC | Facility: CLINIC | Age: 76
End: 2021-01-12
Payer: MEDICARE

## 2021-01-12 DIAGNOSIS — Z23 NEED FOR VACCINATION: ICD-10-CM

## 2021-01-12 PROCEDURE — 91300 COVID-19, MRNA, LNP-S, PF, 30 MCG/0.3 ML DOSE VACCINE: CPT | Mod: S$GLB,,, | Performed by: FAMILY MEDICINE

## 2021-01-12 PROCEDURE — 0001A COVID-19, MRNA, LNP-S, PF, 30 MCG/0.3 ML DOSE VACCINE: CPT | Mod: S$GLB,,, | Performed by: FAMILY MEDICINE

## 2021-01-12 PROCEDURE — 0001A COVID-19, MRNA, LNP-S, PF, 30 MCG/0.3 ML DOSE VACCINE: ICD-10-PCS | Mod: S$GLB,,, | Performed by: FAMILY MEDICINE

## 2021-01-12 PROCEDURE — 91300 COVID-19, MRNA, LNP-S, PF, 30 MCG/0.3 ML DOSE VACCINE: ICD-10-PCS | Mod: S$GLB,,, | Performed by: FAMILY MEDICINE

## 2021-02-02 ENCOUNTER — IMMUNIZATION (OUTPATIENT)
Dept: PRIMARY CARE CLINIC | Facility: CLINIC | Age: 76
End: 2021-02-02
Payer: MEDICARE

## 2021-02-02 DIAGNOSIS — Z23 NEED FOR VACCINATION: Primary | ICD-10-CM

## 2021-02-02 PROCEDURE — 91300 COVID-19, MRNA, LNP-S, PF, 30 MCG/0.3 ML DOSE VACCINE: CPT | Mod: S$GLB,,, | Performed by: FAMILY MEDICINE

## 2021-02-02 PROCEDURE — 0002A COVID-19, MRNA, LNP-S, PF, 30 MCG/0.3 ML DOSE VACCINE: CPT | Mod: CV19,S$GLB,, | Performed by: FAMILY MEDICINE

## 2021-02-02 PROCEDURE — 0002A COVID-19, MRNA, LNP-S, PF, 30 MCG/0.3 ML DOSE VACCINE: ICD-10-PCS | Mod: CV19,S$GLB,, | Performed by: FAMILY MEDICINE

## 2021-02-02 PROCEDURE — 91300 COVID-19, MRNA, LNP-S, PF, 30 MCG/0.3 ML DOSE VACCINE: ICD-10-PCS | Mod: S$GLB,,, | Performed by: FAMILY MEDICINE

## 2021-02-18 DIAGNOSIS — Z12.31 ENCOUNTER FOR SCREENING MAMMOGRAM FOR MALIGNANT NEOPLASM OF BREAST: Primary | ICD-10-CM

## 2021-02-18 DIAGNOSIS — Z13.820 ENCOUNTER FOR SCREENING FOR OSTEOPOROSIS: ICD-10-CM

## 2021-02-18 DIAGNOSIS — Z13.820 SPECIAL SCREENING FOR OSTEOPOROSIS: ICD-10-CM

## 2021-03-25 ENCOUNTER — TELEPHONE (OUTPATIENT)
Dept: HEMATOLOGY/ONCOLOGY | Facility: CLINIC | Age: 76
End: 2021-03-25

## 2021-03-25 ENCOUNTER — HOSPITAL ENCOUNTER (OUTPATIENT)
Dept: RADIOLOGY | Facility: HOSPITAL | Age: 76
Discharge: HOME | End: 2021-03-25
Attending: INTERNAL MEDICINE
Payer: MEDICARE

## 2021-03-25 VITALS — HEIGHT: 61 IN | BODY MASS INDEX: 26.43 KG/M2 | WEIGHT: 140 LBS

## 2021-03-25 DIAGNOSIS — C50.411 MALIGNANT NEOPLASM OF UPPER-OUTER QUADRANT OF RIGHT BREAST IN FEMALE, ESTROGEN RECEPTOR NEGATIVE: ICD-10-CM

## 2021-03-25 DIAGNOSIS — Z17.1 MALIGNANT NEOPLASM OF UPPER-OUTER QUADRANT OF RIGHT BREAST IN FEMALE, ESTROGEN RECEPTOR NEGATIVE: ICD-10-CM

## 2021-03-25 DIAGNOSIS — Z17.1 ESTROGEN RECEPTOR NEGATIVE TUMOR STATUS: ICD-10-CM

## 2021-03-25 LAB — GLUCOSE SERPL-MCNC: 106 MG/DL (ref 70–110)

## 2021-03-25 PROCEDURE — 82962 GLUCOSE BLOOD TEST: CPT | Mod: PO

## 2021-09-10 LAB
ALBUMIN SERPL-MCNC: 4.3 G/DL (ref 3.6–5.1)
ALBUMIN/GLOB SERPL: 1.3 (CALC) (ref 1–2.5)
ALP SERPL-CCNC: 56 U/L (ref 37–153)
ALT SERPL-CCNC: 23 U/L (ref 6–29)
AST SERPL-CCNC: 20 U/L (ref 10–35)
BASOPHILS # BLD AUTO: 31 CELLS/UL (ref 0–200)
BASOPHILS NFR BLD AUTO: 0.7 %
BILIRUB SERPL-MCNC: 0.8 MG/DL (ref 0.2–1.2)
BUN SERPL-MCNC: 22 MG/DL (ref 7–25)
BUN/CREAT SERPL: ABNORMAL (CALC) (ref 6–22)
CALCIUM SERPL-MCNC: 9.7 MG/DL (ref 8.6–10.4)
CHLORIDE SERPL-SCNC: 102 MMOL/L (ref 98–110)
CO2 SERPL-SCNC: 29 MMOL/L (ref 20–32)
CREAT SERPL-MCNC: 0.76 MG/DL (ref 0.6–0.93)
EOSINOPHIL # BLD AUTO: 128 CELLS/UL (ref 15–500)
EOSINOPHIL NFR BLD AUTO: 2.9 %
ERYTHROCYTE [DISTWIDTH] IN BLOOD BY AUTOMATED COUNT: 12 % (ref 11–15)
GLOBULIN SER CALC-MCNC: 3.2 G/DL (CALC) (ref 1.9–3.7)
GLUCOSE SERPL-MCNC: 105 MG/DL (ref 65–99)
HCT VFR BLD AUTO: 40.2 % (ref 35–45)
HGB BLD-MCNC: 13.1 G/DL (ref 11.7–15.5)
LYMPHOCYTES # BLD AUTO: 1232 CELLS/UL (ref 850–3900)
LYMPHOCYTES NFR BLD AUTO: 28 %
MCH RBC QN AUTO: 30.9 PG (ref 27–33)
MCHC RBC AUTO-ENTMCNC: 32.6 G/DL (ref 32–36)
MCV RBC AUTO: 94.8 FL (ref 80–100)
MONOCYTES # BLD AUTO: 488 CELLS/UL (ref 200–950)
MONOCYTES NFR BLD AUTO: 11.1 %
NEUTROPHILS # BLD AUTO: 2521 CELLS/UL (ref 1500–7800)
NEUTROPHILS NFR BLD AUTO: 57.3 %
PLATELET # BLD AUTO: 186 THOUSAND/UL (ref 140–400)
PMV BLD REES-ECKER: 10.7 FL (ref 7.5–12.5)
POTASSIUM SERPL-SCNC: 4.6 MMOL/L (ref 3.5–5.3)
PROT SERPL-MCNC: 7.5 G/DL (ref 6.1–8.1)
RBC # BLD AUTO: 4.24 MILLION/UL (ref 3.8–5.1)
SODIUM SERPL-SCNC: 140 MMOL/L (ref 135–146)
WBC # BLD AUTO: 4.4 THOUSAND/UL (ref 3.8–10.8)

## 2021-09-13 ENCOUNTER — OFFICE VISIT (OUTPATIENT)
Dept: HEMATOLOGY/ONCOLOGY | Facility: CLINIC | Age: 76
End: 2021-09-13
Payer: MEDICARE

## 2021-09-13 VITALS
HEIGHT: 61 IN | HEART RATE: 90 BPM | SYSTOLIC BLOOD PRESSURE: 149 MMHG | RESPIRATION RATE: 18 BRPM | WEIGHT: 137 LBS | DIASTOLIC BLOOD PRESSURE: 81 MMHG | BODY MASS INDEX: 25.86 KG/M2

## 2021-09-13 DIAGNOSIS — Z17.1 MALIGNANT NEOPLASM OF UPPER-OUTER QUADRANT OF RIGHT BREAST IN FEMALE, ESTROGEN RECEPTOR NEGATIVE: Primary | ICD-10-CM

## 2021-09-13 DIAGNOSIS — C50.411 MALIGNANT NEOPLASM OF UPPER-OUTER QUADRANT OF RIGHT BREAST IN FEMALE, ESTROGEN RECEPTOR NEGATIVE: Primary | ICD-10-CM

## 2021-09-13 DIAGNOSIS — T45.1X5A ANEMIA DUE TO CHEMOTHERAPY: ICD-10-CM

## 2021-09-13 DIAGNOSIS — Z17.1 ESTROGEN RECEPTOR NEGATIVE TUMOR STATUS: ICD-10-CM

## 2021-09-13 DIAGNOSIS — D64.81 ANEMIA DUE TO CHEMOTHERAPY: ICD-10-CM

## 2021-09-13 PROCEDURE — 3077F PR MOST RECENT SYSTOLIC BLOOD PRESSURE >= 140 MM HG: ICD-10-PCS | Mod: S$GLB,,, | Performed by: INTERNAL MEDICINE

## 2021-09-13 PROCEDURE — 3288F FALL RISK ASSESSMENT DOCD: CPT | Mod: S$GLB,,, | Performed by: INTERNAL MEDICINE

## 2021-09-13 PROCEDURE — 99213 PR OFFICE/OUTPT VISIT, EST, LEVL III, 20-29 MIN: ICD-10-PCS | Mod: S$GLB,,, | Performed by: INTERNAL MEDICINE

## 2021-09-13 PROCEDURE — 3079F PR MOST RECENT DIASTOLIC BLOOD PRESSURE 80-89 MM HG: ICD-10-PCS | Mod: S$GLB,,, | Performed by: INTERNAL MEDICINE

## 2021-09-13 PROCEDURE — 1101F PT FALLS ASSESS-DOCD LE1/YR: CPT | Mod: S$GLB,,, | Performed by: INTERNAL MEDICINE

## 2021-09-13 PROCEDURE — 1126F PR PAIN SEVERITY QUANTIFIED, NO PAIN PRESENT: ICD-10-PCS | Mod: S$GLB,,, | Performed by: INTERNAL MEDICINE

## 2021-09-13 PROCEDURE — 1159F MED LIST DOCD IN RCRD: CPT | Mod: S$GLB,,, | Performed by: INTERNAL MEDICINE

## 2021-09-13 PROCEDURE — 1126F AMNT PAIN NOTED NONE PRSNT: CPT | Mod: S$GLB,,, | Performed by: INTERNAL MEDICINE

## 2021-09-13 PROCEDURE — 99213 OFFICE O/P EST LOW 20 MIN: CPT | Mod: S$GLB,,, | Performed by: INTERNAL MEDICINE

## 2021-09-13 PROCEDURE — 1160F PR REVIEW ALL MEDS BY PRESCRIBER/CLIN PHARMACIST DOCUMENTED: ICD-10-PCS | Mod: S$GLB,,, | Performed by: INTERNAL MEDICINE

## 2021-09-13 PROCEDURE — 1101F PR PT FALLS ASSESS DOC 0-1 FALLS W/OUT INJ PAST YR: ICD-10-PCS | Mod: S$GLB,,, | Performed by: INTERNAL MEDICINE

## 2021-09-13 PROCEDURE — 1160F RVW MEDS BY RX/DR IN RCRD: CPT | Mod: S$GLB,,, | Performed by: INTERNAL MEDICINE

## 2021-09-13 PROCEDURE — 3288F PR FALLS RISK ASSESSMENT DOCUMENTED: ICD-10-PCS | Mod: S$GLB,,, | Performed by: INTERNAL MEDICINE

## 2021-09-13 PROCEDURE — 3079F DIAST BP 80-89 MM HG: CPT | Mod: S$GLB,,, | Performed by: INTERNAL MEDICINE

## 2021-09-13 PROCEDURE — 3077F SYST BP >= 140 MM HG: CPT | Mod: S$GLB,,, | Performed by: INTERNAL MEDICINE

## 2021-09-13 PROCEDURE — 1159F PR MEDICATION LIST DOCUMENTED IN MEDICAL RECORD: ICD-10-PCS | Mod: S$GLB,,, | Performed by: INTERNAL MEDICINE

## 2021-09-27 ENCOUNTER — IMMUNIZATION (OUTPATIENT)
Dept: PRIMARY CARE CLINIC | Facility: CLINIC | Age: 76
End: 2021-09-27
Payer: MEDICARE

## 2021-09-27 DIAGNOSIS — Z23 NEED FOR VACCINATION: Primary | ICD-10-CM

## 2021-09-27 PROCEDURE — 0003A COVID-19, MRNA, LNP-S, PF, 30 MCG/0.3 ML DOSE VACCINE: CPT | Mod: S$GLB,,, | Performed by: FAMILY MEDICINE

## 2021-09-27 PROCEDURE — 0003A COVID-19, MRNA, LNP-S, PF, 30 MCG/0.3 ML DOSE VACCINE: ICD-10-PCS | Mod: S$GLB,,, | Performed by: FAMILY MEDICINE

## 2021-09-27 PROCEDURE — 91300 COVID-19, MRNA, LNP-S, PF, 30 MCG/0.3 ML DOSE VACCINE: ICD-10-PCS | Mod: S$GLB,,, | Performed by: FAMILY MEDICINE

## 2021-09-27 PROCEDURE — 91300 COVID-19, MRNA, LNP-S, PF, 30 MCG/0.3 ML DOSE VACCINE: CPT | Mod: S$GLB,,, | Performed by: FAMILY MEDICINE

## 2021-10-18 DIAGNOSIS — Z17.1 MALIGNANT NEOPLASM OF UPPER-OUTER QUADRANT OF RIGHT BREAST IN FEMALE, ESTROGEN RECEPTOR NEGATIVE: Primary | ICD-10-CM

## 2021-10-18 DIAGNOSIS — C50.411 MALIGNANT NEOPLASM OF UPPER-OUTER QUADRANT OF RIGHT BREAST IN FEMALE, ESTROGEN RECEPTOR NEGATIVE: Primary | ICD-10-CM

## 2021-11-03 ENCOUNTER — TELEPHONE (OUTPATIENT)
Dept: HEMATOLOGY/ONCOLOGY | Facility: CLINIC | Age: 76
End: 2021-11-03
Payer: MEDICARE

## 2021-11-03 DIAGNOSIS — Z12.31 SCREENING MAMMOGRAM FOR HIGH-RISK PATIENT: Primary | ICD-10-CM

## 2021-11-19 ENCOUNTER — HOSPITAL ENCOUNTER (OUTPATIENT)
Dept: RADIOLOGY | Facility: HOSPITAL | Age: 76
Discharge: HOME | End: 2021-11-19
Attending: INTERNAL MEDICINE
Payer: MEDICARE

## 2021-11-19 DIAGNOSIS — Z12.31 SCREENING MAMMOGRAM FOR HIGH-RISK PATIENT: ICD-10-CM

## 2021-11-19 PROCEDURE — 77067 SCR MAMMO BI INCL CAD: CPT | Mod: TC,PO

## 2022-02-10 ENCOUNTER — TELEPHONE (OUTPATIENT)
Dept: HEMATOLOGY/ONCOLOGY | Facility: CLINIC | Age: 77
End: 2022-02-10
Payer: MEDICARE

## 2022-02-10 NOTE — TELEPHONE ENCOUNTER
Left message informing patient that per Dr. Calle, it would fine for patient to get her 4th pfizer dose and to call with any questions/concerns.

## 2022-02-17 ENCOUNTER — IMMUNIZATION (OUTPATIENT)
Dept: PRIMARY CARE CLINIC | Facility: CLINIC | Age: 77
End: 2022-02-17
Payer: MEDICARE

## 2022-02-17 DIAGNOSIS — Z23 NEED FOR VACCINATION: Primary | ICD-10-CM

## 2022-02-17 PROCEDURE — 91300 COVID-19, MRNA, LNP-S, PF, 30 MCG/0.3 ML DOSE VACCINE: CPT | Mod: S$GLB,,, | Performed by: FAMILY MEDICINE

## 2022-02-17 PROCEDURE — 91300 COVID-19, MRNA, LNP-S, PF, 30 MCG/0.3 ML DOSE VACCINE: ICD-10-PCS | Mod: S$GLB,,, | Performed by: FAMILY MEDICINE

## 2022-04-28 ENCOUNTER — HOSPITAL ENCOUNTER (OUTPATIENT)
Dept: RADIOLOGY | Facility: HOSPITAL | Age: 77
Discharge: HOME | End: 2022-04-28
Attending: INTERNAL MEDICINE
Payer: MEDICARE

## 2022-04-28 DIAGNOSIS — C50.411 MALIGNANT NEOPLASM OF UPPER-OUTER QUADRANT OF RIGHT BREAST IN FEMALE, ESTROGEN RECEPTOR NEGATIVE: ICD-10-CM

## 2022-04-28 DIAGNOSIS — Z17.1 MALIGNANT NEOPLASM OF UPPER-OUTER QUADRANT OF RIGHT BREAST IN FEMALE, ESTROGEN RECEPTOR NEGATIVE: ICD-10-CM

## 2022-04-28 PROCEDURE — 71046 X-RAY EXAM CHEST 2 VIEWS: CPT | Mod: TC,PO

## 2022-07-25 DIAGNOSIS — Z78.0 MENOPAUSE: ICD-10-CM

## 2022-07-25 DIAGNOSIS — M85.88 OTHER SPECIFIED DISORDERS OF BONE DENSITY AND STRUCTURE, OTHER SITE: Primary | ICD-10-CM

## 2022-08-04 ENCOUNTER — HOSPITAL ENCOUNTER (OUTPATIENT)
Dept: RADIOLOGY | Facility: HOSPITAL | Age: 77
Discharge: HOME | End: 2022-08-04
Attending: FAMILY MEDICINE
Payer: MEDICARE

## 2022-08-04 ENCOUNTER — HOSPITAL ENCOUNTER (OUTPATIENT)
Dept: RADIOLOGY | Facility: HOSPITAL | Age: 77
Discharge: HOME | End: 2022-08-04
Attending: NURSE PRACTITIONER
Payer: MEDICARE

## 2022-08-04 DIAGNOSIS — Z78.0 MENOPAUSE: ICD-10-CM

## 2022-08-04 DIAGNOSIS — M79.605 LEFT LEG PAIN: ICD-10-CM

## 2022-08-04 DIAGNOSIS — M85.88 OTHER SPECIFIED DISORDERS OF BONE DENSITY AND STRUCTURE, OTHER SITE: ICD-10-CM

## 2022-08-04 PROCEDURE — 77080 DXA BONE DENSITY AXIAL: CPT | Mod: TC,PO

## 2022-08-04 PROCEDURE — 73560 X-RAY EXAM OF KNEE 1 OR 2: CPT | Mod: TC,PO,LT

## 2022-08-04 PROCEDURE — 73610 X-RAY EXAM OF ANKLE: CPT | Mod: TC,PO,LT

## 2022-09-07 DIAGNOSIS — T45.1X5A ANEMIA DUE TO CHEMOTHERAPY: ICD-10-CM

## 2022-09-07 DIAGNOSIS — C50.411 MALIGNANT NEOPLASM OF UPPER-OUTER QUADRANT OF RIGHT BREAST IN FEMALE, ESTROGEN RECEPTOR NEGATIVE: Primary | ICD-10-CM

## 2022-09-07 DIAGNOSIS — Z17.1 MALIGNANT NEOPLASM OF UPPER-OUTER QUADRANT OF RIGHT BREAST IN FEMALE, ESTROGEN RECEPTOR NEGATIVE: Primary | ICD-10-CM

## 2022-09-07 DIAGNOSIS — Z79.899 ENCOUNTER FOR LONG-TERM (CURRENT) USE OF MEDICATIONS: ICD-10-CM

## 2022-09-07 DIAGNOSIS — D64.81 ANEMIA DUE TO CHEMOTHERAPY: ICD-10-CM

## 2022-09-09 NOTE — PROGRESS NOTES
"   [facility] Hematology/Oncology  PROGRESS NOTE      Subjective:       Patient ID:   NAME: Eugenia Darling : 1945     76 y.o. female    Referring Doc: Yoselyn  Other Physicians: Tammi Fernandez, Jd    Chief Complaint:  Breast ca f/u    History of Present Illness:     Patient returns today for a regularly scheduled follow-up visit.  The patient is here by herself today. Her  passed away since last visit.  She reports that she feels "good".      . She is doing ok with no new issues. She denies any CP, SOB, HA's or N/V.  She had prior PEt on 3/25/2021 and had a mammogram in 2021    Discussed covid19 precautions - she had her vaccinations    ROS:   GEN: normal without any fever, night sweats or weight loss  HEENT: normal with no HA's, sore throat, stiff neck, changes in vision  CV: normal with no CP, SOB, PND, LEAVITT or orthopnea  PULM: normal with no SOB, cough, hemoptysis, sputum or pleuritic pain  GI:  no abdominal pain, nausea, vomiting, constipation,, melanotic stools, BRBPR, or hematemesis; no diarrhea  : normal with no hematuria, dysuria  BREAST: normal with no mass, discharge, pain  SKIN: normal with no rash, erythema, bruising, or swelling    Allergies:  Review of patient's allergies indicates:   Allergen Reactions    Adhesive tape-silicones Rash       Medications:    Current Outpatient Medications:     aspirin 81 MG Chew, Take 81 mg by mouth once daily., Disp: , Rfl:     CALCIUM CARBONATE (CALCIUM 500 ORAL), Take 500 mg by mouth once daily., Disp: , Rfl:     irbesartan (AVAPRO) 150 MG tablet, Take 150 mg by mouth every evening., Disp: , Rfl:     krill oil 500 mg Cap, Take by mouth., Disp: , Rfl:     metoprolol tartrate (LOPRESSOR) 25 MG tablet, Take 25 mg by mouth 2 (two) times daily., Disp: , Rfl:     multivitamin (THERAGRAN) per tablet, Take 1 tablet by mouth once daily., Disp: , Rfl:     simvastatin (ZOCOR) 10 MG tablet, Take 10 mg by mouth every evening., Disp: , Rfl:     turmeric root " extract 500 mg Cap, Take by mouth., Disp: , Rfl:     LACTOBACILLUS RHAMNOSUS GG (CULTURELLE ORAL), Take by mouth., Disp: , Rfl:     losartan (COZAAR) 100 MG tablet, Take 100 mg by mouth once daily., Disp: , Rfl:     PMHx/PSHx Updates:  See patient's last visit with me on 9/13/2021  See H&P on Vol #1            Pathology:  See Vol #1          Objective:     Vitals:  Blood pressure (!) 147/67, pulse 70, temperature 97 °F (36.1 °C), resp. rate 18, height 5' (1.524 m), weight 63.6 kg (140 lb 4.8 oz).    Physical Examination:   GEN: no apparent distress, comfortable; AAOx3  HEAD: atraumatic and normocephalic  EYES: no pallor, no icterus, PERRLA  ENT: OMM, no pharyngeal erythema, external ears WNL; no nasal discharge; no thrush  NECK: no masses, thyroid normal, trachea midline, no LAD/LN's, supple  CV: RRR with no murmur; normal pulse; normal S1 and S2; no pedal edema  CHEST: Normal respiratory effort; CTAB; normal breath sounds; no wheeze or crackles  ABDOM: nontender and nondistended; soft; normal bowel sounds; no rebound/guarding  MUSC/Skeletal: ROM normal; no crepitus; joints normal; no deformities or arthropathy  EXTREM: no clubbing, cyanosis, inflammation or swelling  SKIN: no rashes, lesions, ulcers, petechiae or subcutaneous nodules  : no turner  NEURO: grossly intact; motor/sensory WNL; AAOx3; no tremors  PSYCH: normal mood, affect and behavior  LYMPH: normal cervical, supraclavicular, axillary and groin LN's  BREAST: no changes with prior lumpectomy scar stable          Labs:        Lab Results   Component Value Date    WBC 4.2 09/09/2022    HGB 13.4 09/09/2022    HCT 39.9 09/09/2022    MCV 91.3 09/09/2022     09/09/2022     BMP  Lab Results   Component Value Date     09/09/2022    K 4.6 09/09/2022     09/09/2022    CO2 30 09/09/2022    BUN 16 09/09/2022    CREATININE 0.80 09/09/2022    CALCIUM 10.0 09/09/2022    ESTGFRAFRICA 89 09/09/2021    EGFRNONAA 77 09/09/2021     Lab Results    Component Value Date    ALT 15 09/09/2022    AST 19 09/09/2022    ALKPHOS 58 03/05/2020    BILITOT 0.7 09/09/2022           Radiology/Diagnostic Studies:    Mammo 11/19/2021:    Impression:     Benign mammogram.         CXR 4/28/2022:    IMPRESSION:  No acute pulmonary process     PET 3/25/2021:    IMPRESSION:  Prior right-sided mastectomy with no PET/CT finding of FDG avid  residual/recurrent or metastatic disease.           MAmmo 11/16/2020:    Impression:     Benign diagnostic exam.  Return to annual screening mammography is recommended.     BI-RADS CATEGORY 2: BENIGN FINDING  PET  3/2/2020:      Impression       No evidence of FDG avid residual, recurrent or metastatic malignancy         Mammo 11/15/2019:    Impression:     No change from prior examination. No mammographic evidence of malignancy.     TECHNOLOGIST: Mirtha Hairston     BI-RADS CATEGORY 2- BENIGN     RECOMMENDATION: ANNUAL SCGREENING MAMMOGRAM              PET  2/11/2019:    IMPRESSION:  Negative for recurrent malignancy or metastatic disease.            Mammogram  10/30/2018:  IMPRESSION:    Changes related to previous lumpectomy and subsequent radiation therapy  posteriorly at the 12 o'clock position within the right breast.    No mammographic evidence of malignancy.      BI-RADS CATEGORY 2: BENIGN FINDING.      PET scan:   1/11/2018    IMPRESSION:    Negative for metastatic disease or residual/recurrent malignancy.          [facility] Unknown Rad Eap    Result Date: 10/30/2017  CMS MANDATED QUALITY DATA - MAMMOGRAPHY - 225 [facility] utilizes a reminder system to ensure that all patients receive reminder letters and/or direct phone calls for appointments. ?This includes ?reminders for routine screening mammograms, diagnostic mammograms, and other breast imaging interventions when appropriate. ?This patient will be placed in the?appropriate reminder system. BILATERAL DIGITAL DIAGNOSTIC MAMMOGRAM with CAD with tomosynthesis CLINICAL  INFORMATION: Short-term follow-up following lumpectomy for right breast carcinoma. Technologist: Ina Vega. FINDINGS: Comparison made with prior mammograms dating back to 08/13/2014. The breasts are heterogeneously dense, which lowers the sensitivity of mammography. There is scarring in the right breast at 12 o'clock position from previous lumpectomy. In the rest of the breast, there are no other areas of architectural distortion and there are no masses or suspicious grouped calcifications.     IMPRESSION: There is no evidence of malignancy. Recommend routine screening. BI-RADS CATEGORY 1: NEGATIVE. Read and electronically signed by: Jonah Gu MD on 10/30/2017 9:29 AM CDT JONAH GU MD      I have reviewed all available lab results and radiology reports.    Assessment/Plan:     (1) 75 y.o. female with diagnosis of right breast cancer  - stage IIIA   Triple neg  - s/p right lumpectomy in Oct 2015 by Dr Cadena with 4 out of 13 LN's that were positive  - s/p AC, taxol and XRT  - mammo 10/30/2018 was negative  - PET/CT 1/11/2018 is negative and repeat PET 2/11/2019 was also negative for recurrent disease  - recent PEt on 3/2/2020 negative for any recurrence  - repeat mammo due in  Nov 2020 9/13/2021:  - latest PET in march 2021 was adequate  - prior mammo on 11/16/2020 was negative  - doing well  - repeat mammo in Nov 2021 9/12/2022:  - she is doing well with no new issues  - mammo in Nov 2021 was negative  - CXR iN April 2022 was negative          (2) HTN - followed by Dr Topete; BP good currently    (3) Chronic diarrhea issues - seen by Dr Miles - resolved - it was found to be due to the krill oil       1. Malignant neoplasm of upper-outer quadrant of right breast in female, estrogen receptor negative        2. Anemia due to chemotherapy        3. Estrogen receptor negative tumor status                PLAN:  1. Check up to date labs every 6 months  2. F/u with PCP, GYN, Gen Surg etc  3.  RTC in 12 months  4. mammo next Nov 2022; CXR in April 2023        Fax note to  Manpreet Topete MD, LENORE Rosas, Jd and Tammi    Discussion:     COVID-19 Discussion:    I had long discussion with patient and any applicable family about the COVID-19 coronavirus epidemic and the recommended precautions with regard to cancer and/or hematology patients. I have re-iterated the CDC recommendations for adequate hand washing, use of hand -like products, and coughing into elbow, etc. In addition, especially for our patients who are on chemotherapy and/or our otherwise immunocompromised patients, I have recommended avoidance of crowds, including movie theaters, restaurants, churches, etc. I have recommended avoidance of any sick or symptomatic family members and/or friends. I have also recommended avoidance of any raw and unwashed food products, and general avoidance of food items that have not been prepared by themselves. The patient has been asked to call us immediately with any symptom developments, issues, questions or other general concerns.     I have explained all of the above in detail and the patient understands all of the current recommendation(s). I have answered all of their questions to the best of my ability and to their complete satisfaction.   The patient is to continue with the current management plan.            Electronically signed by Abel Calle MD          Answers submitted by the patient for this visit:  Review of Systems Questionnaire (Submitted on 9/9/2022)  appetite change : No  unexpected weight change: No  mouth sores: No  visual disturbance: No  cough: No  shortness of breath: No  chest pain: No  abdominal pain: No  diarrhea: No  frequency: No  back pain: No  rash: No  headaches: No  adenopathy: No  nervous/ anxious: No

## 2022-09-10 LAB
ALBUMIN SERPL-MCNC: 4.5 G/DL (ref 3.6–5.1)
ALBUMIN/GLOB SERPL: 1.4 (CALC) (ref 1–2.5)
ALP SERPL-CCNC: 64 U/L (ref 37–153)
ALT SERPL-CCNC: 15 U/L (ref 6–29)
AST SERPL-CCNC: 19 U/L (ref 10–35)
BASOPHILS # BLD AUTO: 21 CELLS/UL (ref 0–200)
BASOPHILS NFR BLD AUTO: 0.5 %
BILIRUB SERPL-MCNC: 0.7 MG/DL (ref 0.2–1.2)
BUN SERPL-MCNC: 16 MG/DL (ref 7–25)
BUN/CREAT SERPL: ABNORMAL (CALC) (ref 6–22)
CALCIUM SERPL-MCNC: 10 MG/DL (ref 8.6–10.4)
CHLORIDE SERPL-SCNC: 101 MMOL/L (ref 98–110)
CO2 SERPL-SCNC: 30 MMOL/L (ref 20–32)
CREAT SERPL-MCNC: 0.8 MG/DL (ref 0.6–1)
EGFR: 76 ML/MIN/1.73M2
EOSINOPHIL # BLD AUTO: 181 CELLS/UL (ref 15–500)
EOSINOPHIL NFR BLD AUTO: 4.3 %
ERYTHROCYTE [DISTWIDTH] IN BLOOD BY AUTOMATED COUNT: 11.9 % (ref 11–15)
GLOBULIN SER CALC-MCNC: 3.2 G/DL (CALC) (ref 1.9–3.7)
GLUCOSE SERPL-MCNC: 101 MG/DL (ref 65–99)
HCT VFR BLD AUTO: 39.9 % (ref 35–45)
HGB BLD-MCNC: 13.4 G/DL (ref 11.7–15.5)
LYMPHOCYTES # BLD AUTO: 1336 CELLS/UL (ref 850–3900)
LYMPHOCYTES NFR BLD AUTO: 31.8 %
MCH RBC QN AUTO: 30.7 PG (ref 27–33)
MCHC RBC AUTO-ENTMCNC: 33.6 G/DL (ref 32–36)
MCV RBC AUTO: 91.3 FL (ref 80–100)
MONOCYTES # BLD AUTO: 470 CELLS/UL (ref 200–950)
MONOCYTES NFR BLD AUTO: 11.2 %
NEUTROPHILS # BLD AUTO: 2192 CELLS/UL (ref 1500–7800)
NEUTROPHILS NFR BLD AUTO: 52.2 %
PLATELET # BLD AUTO: 201 THOUSAND/UL (ref 140–400)
PMV BLD REES-ECKER: 11.1 FL (ref 7.5–12.5)
POTASSIUM SERPL-SCNC: 4.6 MMOL/L (ref 3.5–5.3)
PROT SERPL-MCNC: 7.7 G/DL (ref 6.1–8.1)
RBC # BLD AUTO: 4.37 MILLION/UL (ref 3.8–5.1)
SODIUM SERPL-SCNC: 140 MMOL/L (ref 135–146)
WBC # BLD AUTO: 4.2 THOUSAND/UL (ref 3.8–10.8)

## 2022-09-12 ENCOUNTER — OFFICE VISIT (OUTPATIENT)
Dept: HEMATOLOGY/ONCOLOGY | Facility: CLINIC | Age: 77
End: 2022-09-12
Payer: MEDICARE

## 2022-09-12 VITALS
BODY MASS INDEX: 27.55 KG/M2 | WEIGHT: 140.31 LBS | SYSTOLIC BLOOD PRESSURE: 147 MMHG | HEIGHT: 60 IN | DIASTOLIC BLOOD PRESSURE: 67 MMHG | RESPIRATION RATE: 18 BRPM | TEMPERATURE: 97 F | HEART RATE: 70 BPM

## 2022-09-12 DIAGNOSIS — Z17.1 ESTROGEN RECEPTOR NEGATIVE TUMOR STATUS: ICD-10-CM

## 2022-09-12 DIAGNOSIS — Z17.1 MALIGNANT NEOPLASM OF UPPER-OUTER QUADRANT OF RIGHT BREAST IN FEMALE, ESTROGEN RECEPTOR NEGATIVE: Primary | ICD-10-CM

## 2022-09-12 DIAGNOSIS — T45.1X5A ANEMIA DUE TO CHEMOTHERAPY: ICD-10-CM

## 2022-09-12 DIAGNOSIS — D64.81 ANEMIA DUE TO CHEMOTHERAPY: ICD-10-CM

## 2022-09-12 DIAGNOSIS — C50.411 MALIGNANT NEOPLASM OF UPPER-OUTER QUADRANT OF RIGHT BREAST IN FEMALE, ESTROGEN RECEPTOR NEGATIVE: Primary | ICD-10-CM

## 2022-09-12 PROCEDURE — 1126F PR PAIN SEVERITY QUANTIFIED, NO PAIN PRESENT: ICD-10-PCS | Mod: CPTII,S$GLB,, | Performed by: INTERNAL MEDICINE

## 2022-09-12 PROCEDURE — 3288F PR FALLS RISK ASSESSMENT DOCUMENTED: ICD-10-PCS | Mod: CPTII,S$GLB,, | Performed by: INTERNAL MEDICINE

## 2022-09-12 PROCEDURE — 3077F SYST BP >= 140 MM HG: CPT | Mod: CPTII,S$GLB,, | Performed by: INTERNAL MEDICINE

## 2022-09-12 PROCEDURE — 1101F PT FALLS ASSESS-DOCD LE1/YR: CPT | Mod: CPTII,S$GLB,, | Performed by: INTERNAL MEDICINE

## 2022-09-12 PROCEDURE — 1160F PR REVIEW ALL MEDS BY PRESCRIBER/CLIN PHARMACIST DOCUMENTED: ICD-10-PCS | Mod: CPTII,S$GLB,, | Performed by: INTERNAL MEDICINE

## 2022-09-12 PROCEDURE — 1159F PR MEDICATION LIST DOCUMENTED IN MEDICAL RECORD: ICD-10-PCS | Mod: CPTII,S$GLB,, | Performed by: INTERNAL MEDICINE

## 2022-09-12 PROCEDURE — 3078F DIAST BP <80 MM HG: CPT | Mod: CPTII,S$GLB,, | Performed by: INTERNAL MEDICINE

## 2022-09-12 PROCEDURE — 1126F AMNT PAIN NOTED NONE PRSNT: CPT | Mod: CPTII,S$GLB,, | Performed by: INTERNAL MEDICINE

## 2022-09-12 PROCEDURE — 1159F MED LIST DOCD IN RCRD: CPT | Mod: CPTII,S$GLB,, | Performed by: INTERNAL MEDICINE

## 2022-09-12 PROCEDURE — 3078F PR MOST RECENT DIASTOLIC BLOOD PRESSURE < 80 MM HG: ICD-10-PCS | Mod: CPTII,S$GLB,, | Performed by: INTERNAL MEDICINE

## 2022-09-12 PROCEDURE — 99214 OFFICE O/P EST MOD 30 MIN: CPT | Mod: S$GLB,,, | Performed by: INTERNAL MEDICINE

## 2022-09-12 PROCEDURE — 1160F RVW MEDS BY RX/DR IN RCRD: CPT | Mod: CPTII,S$GLB,, | Performed by: INTERNAL MEDICINE

## 2022-09-12 PROCEDURE — 1101F PR PT FALLS ASSESS DOC 0-1 FALLS W/OUT INJ PAST YR: ICD-10-PCS | Mod: CPTII,S$GLB,, | Performed by: INTERNAL MEDICINE

## 2022-09-12 PROCEDURE — 3077F PR MOST RECENT SYSTOLIC BLOOD PRESSURE >= 140 MM HG: ICD-10-PCS | Mod: CPTII,S$GLB,, | Performed by: INTERNAL MEDICINE

## 2022-09-12 PROCEDURE — 99214 PR OFFICE/OUTPT VISIT, EST, LEVL IV, 30-39 MIN: ICD-10-PCS | Mod: S$GLB,,, | Performed by: INTERNAL MEDICINE

## 2022-09-12 PROCEDURE — 3288F FALL RISK ASSESSMENT DOCD: CPT | Mod: CPTII,S$GLB,, | Performed by: INTERNAL MEDICINE

## 2022-10-24 DIAGNOSIS — Z17.1 MALIGNANT NEOPLASM OF UPPER-OUTER QUADRANT OF RIGHT BREAST IN FEMALE, ESTROGEN RECEPTOR NEGATIVE: Primary | ICD-10-CM

## 2022-10-24 DIAGNOSIS — C50.411 MALIGNANT NEOPLASM OF UPPER-OUTER QUADRANT OF RIGHT BREAST IN FEMALE, ESTROGEN RECEPTOR NEGATIVE: Primary | ICD-10-CM

## 2022-10-24 DIAGNOSIS — Z17.1 ESTROGEN RECEPTOR NEGATIVE TUMOR STATUS: ICD-10-CM

## 2022-11-22 ENCOUNTER — IMMUNIZATION (OUTPATIENT)
Dept: PRIMARY CARE CLINIC | Facility: CLINIC | Age: 77
End: 2022-11-22
Payer: MEDICARE

## 2022-11-22 DIAGNOSIS — Z23 NEED FOR VACCINATION: Primary | ICD-10-CM

## 2022-11-22 PROCEDURE — 0124A COVID-19, MRNA, LNP-S, BIVALENT BOOSTER, PF, 30 MCG/0.3 ML DOSE: ICD-10-PCS | Mod: S$GLB,,, | Performed by: FAMILY MEDICINE

## 2022-11-22 PROCEDURE — 0124A COVID-19, MRNA, LNP-S, BIVALENT BOOSTER, PF, 30 MCG/0.3 ML DOSE: CPT | Mod: S$GLB,,, | Performed by: FAMILY MEDICINE

## 2022-11-22 PROCEDURE — 91312 COVID-19, MRNA, LNP-S, BIVALENT BOOSTER, PF, 30 MCG/0.3 ML DOSE: ICD-10-PCS | Mod: S$GLB,,, | Performed by: FAMILY MEDICINE

## 2022-11-22 PROCEDURE — 91312 COVID-19, MRNA, LNP-S, BIVALENT BOOSTER, PF, 30 MCG/0.3 ML DOSE: CPT | Mod: S$GLB,,, | Performed by: FAMILY MEDICINE

## 2022-12-08 ENCOUNTER — HOSPITAL ENCOUNTER (OUTPATIENT)
Dept: RADIOLOGY | Facility: HOSPITAL | Age: 77
Discharge: HOME | End: 2022-12-08
Attending: INTERNAL MEDICINE
Payer: MEDICARE

## 2022-12-08 DIAGNOSIS — C50.411 MALIGNANT NEOPLASM OF UPPER-OUTER QUADRANT OF RIGHT BREAST IN FEMALE, ESTROGEN RECEPTOR NEGATIVE: ICD-10-CM

## 2022-12-08 DIAGNOSIS — Z17.1 ESTROGEN RECEPTOR NEGATIVE TUMOR STATUS: ICD-10-CM

## 2022-12-08 DIAGNOSIS — Z17.1 MALIGNANT NEOPLASM OF UPPER-OUTER QUADRANT OF RIGHT BREAST IN FEMALE, ESTROGEN RECEPTOR NEGATIVE: ICD-10-CM

## 2022-12-08 PROCEDURE — 77062 BREAST TOMOSYNTHESIS BI: CPT | Mod: TC,PO

## 2023-04-06 ENCOUNTER — HOSPITAL ENCOUNTER (OUTPATIENT)
Dept: RADIOLOGY | Facility: HOSPITAL | Age: 78
Discharge: HOME | End: 2023-04-06
Attending: INTERNAL MEDICINE
Payer: MEDICARE

## 2023-04-06 DIAGNOSIS — Z17.1 MALIGNANT NEOPLASM OF UPPER-OUTER QUADRANT OF RIGHT BREAST IN FEMALE, ESTROGEN RECEPTOR NEGATIVE: ICD-10-CM

## 2023-04-06 DIAGNOSIS — C50.411 MALIGNANT NEOPLASM OF UPPER-OUTER QUADRANT OF RIGHT BREAST IN FEMALE, ESTROGEN RECEPTOR NEGATIVE: ICD-10-CM

## 2023-04-06 DIAGNOSIS — Z17.1 ESTROGEN RECEPTOR NEGATIVE TUMOR STATUS: ICD-10-CM

## 2023-04-06 PROCEDURE — 71046 X-RAY EXAM CHEST 2 VIEWS: CPT | Mod: TC,PO

## 2023-10-09 ENCOUNTER — TELEPHONE (OUTPATIENT)
Dept: HEMATOLOGY/ONCOLOGY | Facility: CLINIC | Age: 78
End: 2023-10-09

## 2023-10-09 DIAGNOSIS — C50.411 MALIGNANT NEOPLASM OF UPPER-OUTER QUADRANT OF RIGHT BREAST IN FEMALE, ESTROGEN RECEPTOR NEGATIVE: Primary | ICD-10-CM

## 2023-10-09 DIAGNOSIS — Z17.1 MALIGNANT NEOPLASM OF UPPER-OUTER QUADRANT OF RIGHT BREAST IN FEMALE, ESTROGEN RECEPTOR NEGATIVE: Primary | ICD-10-CM

## 2023-10-24 LAB
ALBUMIN SERPL-MCNC: 4.5 G/DL (ref 3.6–5.1)
ALBUMIN/GLOB SERPL: 1.3 (CALC) (ref 1–2.5)
ALP SERPL-CCNC: 74 U/L (ref 37–153)
ALT SERPL-CCNC: 21 U/L (ref 6–29)
AST SERPL-CCNC: 19 U/L (ref 10–35)
BASOPHILS # BLD AUTO: 31 CELLS/UL (ref 0–200)
BASOPHILS NFR BLD AUTO: 0.6 %
BILIRUB SERPL-MCNC: 0.9 MG/DL (ref 0.2–1.2)
BUN SERPL-MCNC: 15 MG/DL (ref 7–25)
BUN/CREAT SERPL: ABNORMAL (CALC) (ref 6–22)
CALCIUM SERPL-MCNC: 10 MG/DL (ref 8.6–10.4)
CHLORIDE SERPL-SCNC: 102 MMOL/L (ref 98–110)
CO2 SERPL-SCNC: 29 MMOL/L (ref 20–32)
CREAT SERPL-MCNC: 0.77 MG/DL (ref 0.6–1)
EGFR: 79 ML/MIN/1.73M2
EOSINOPHIL # BLD AUTO: 151 CELLS/UL (ref 15–500)
EOSINOPHIL NFR BLD AUTO: 2.9 %
ERYTHROCYTE [DISTWIDTH] IN BLOOD BY AUTOMATED COUNT: 12.2 % (ref 11–15)
GLOBULIN SER CALC-MCNC: 3.5 G/DL (CALC) (ref 1.9–3.7)
GLUCOSE SERPL-MCNC: 107 MG/DL (ref 65–99)
HCT VFR BLD AUTO: 42.9 % (ref 35–45)
HGB BLD-MCNC: 14.3 G/DL (ref 11.7–15.5)
LYMPHOCYTES # BLD AUTO: 1820 CELLS/UL (ref 850–3900)
LYMPHOCYTES NFR BLD AUTO: 35 %
MCH RBC QN AUTO: 31.4 PG (ref 27–33)
MCHC RBC AUTO-ENTMCNC: 33.3 G/DL (ref 32–36)
MCV RBC AUTO: 94.3 FL (ref 80–100)
MONOCYTES # BLD AUTO: 572 CELLS/UL (ref 200–950)
MONOCYTES NFR BLD AUTO: 11 %
NEUTROPHILS # BLD AUTO: 2626 CELLS/UL (ref 1500–7800)
NEUTROPHILS NFR BLD AUTO: 50.5 %
PLATELET # BLD AUTO: 213 THOUSAND/UL (ref 140–400)
PMV BLD REES-ECKER: 11 FL (ref 7.5–12.5)
POTASSIUM SERPL-SCNC: 4.5 MMOL/L (ref 3.5–5.3)
PROT SERPL-MCNC: 8 G/DL (ref 6.1–8.1)
RBC # BLD AUTO: 4.55 MILLION/UL (ref 3.8–5.1)
SODIUM SERPL-SCNC: 140 MMOL/L (ref 135–146)
WBC # BLD AUTO: 5.2 THOUSAND/UL (ref 3.8–10.8)

## 2023-10-26 NOTE — PROGRESS NOTES
"   [facility] Hematology/Oncology  PROGRESS NOTE      Subjective:       Patient ID:   NAME: Eugenia Darling : 1945     77 y.o. female    Referring Doc: Yoselyn  Other Physicians: Tammi Fernandez, Jd    Chief Complaint:  Breast ca f/u    History of Present Illness:     Patient returns today for a regularly scheduled follow-up visit.  The patient is here by herself today. She reports that she feels "great".      . She is doing ok with no new issues. She denies any CP, SOB, HA's or N/V.  She had last mammogram in Dec 2022 and is due again on 2023    Discussed covid19 precautions - she had her vaccinations    ROS:   GEN: normal without any fever, night sweats or weight loss  HEENT: normal with no HA's, sore throat, stiff neck, changes in vision  CV: normal with no CP, SOB, PND, LEAVITT or orthopnea  PULM: normal with no SOB, cough, hemoptysis, sputum or pleuritic pain  GI:  no abdominal pain, nausea, vomiting, constipation,, melanotic stools, BRBPR, or hematemesis; no diarrhea  : normal with no hematuria, dysuria  BREAST: normal with no mass, discharge, pain  SKIN: normal with no rash, erythema, bruising, or swelling    Allergies:  Review of patient's allergies indicates:   Allergen Reactions    Adhesive tape-silicones Rash       Medications:    Current Outpatient Medications:     aspirin 81 MG Chew, Take 81 mg by mouth once daily., Disp: , Rfl:     CALCIUM CARBONATE (CALCIUM 500 ORAL), Take 500 mg by mouth once daily., Disp: , Rfl:     irbesartan (AVAPRO) 150 MG tablet, Take 150 mg by mouth every evening., Disp: , Rfl:     krill oil 500 mg Cap, Take by mouth., Disp: , Rfl:     LACTOBACILLUS RHAMNOSUS GG (CULTURELLE ORAL), Take by mouth., Disp: , Rfl:     losartan (COZAAR) 100 MG tablet, Take 100 mg by mouth once daily., Disp: , Rfl:     metoprolol tartrate (LOPRESSOR) 25 MG tablet, Take 25 mg by mouth 2 (two) times daily., Disp: , Rfl:     multivitamin (THERAGRAN) per tablet, Take 1 tablet by mouth once daily., " Disp: , Rfl:     simvastatin (ZOCOR) 10 MG tablet, Take 10 mg by mouth every evening., Disp: , Rfl:     turmeric root extract 500 mg Cap, Take by mouth., Disp: , Rfl:     PMHx/PSHx Updates:  See patient's last visit with me on 9/13/2022  See H&P on Vol #1            Pathology:  See Vol #1          Objective:     Vitals:  Blood pressure (!) 152/82, pulse 85, temperature 96.6 °F (35.9 °C), weight 60.3 kg (132 lb 14.4 oz).    Physical Examination:   GEN: no apparent distress, comfortable; AAOx3  HEAD: atraumatic and normocephalic  EYES: no pallor, no icterus, PERRLA  ENT: OMM, no pharyngeal erythema, external ears WNL; no nasal discharge; no thrush  NECK: no masses, thyroid normal, trachea midline, no LAD/LN's, supple  CV: RRR with no murmur; normal pulse; normal S1 and S2; no pedal edema  CHEST: Normal respiratory effort; CTAB; normal breath sounds; no wheeze or crackles  ABDOM: nontender and nondistended; soft; normal bowel sounds; no rebound/guarding  MUSC/Skeletal: ROM normal; no crepitus; joints normal; no deformities or arthropathy  EXTREM: no clubbing, cyanosis, inflammation or swelling  SKIN: no rashes, lesions, ulcers, petechiae or subcutaneous nodules  : no turner  NEURO: grossly intact; motor/sensory WNL; AAOx3; no tremors  PSYCH: normal mood, affect and behavior  LYMPH: normal cervical, supraclavicular, axillary and groin LN's  BREAST: no changes with prior lumpectomy scar stable          Labs:        Lab Results   Component Value Date    WBC 5.2 10/23/2023    HGB 14.3 10/23/2023    HCT 42.9 10/23/2023    MCV 94.3 10/23/2023     10/23/2023     BMP  Lab Results   Component Value Date     10/23/2023    K 4.5 10/23/2023     10/23/2023    CO2 29 10/23/2023    BUN 15 10/23/2023    CREATININE 0.77 10/23/2023    CALCIUM 10.0 10/23/2023    ESTGFRAFRICA 89 09/09/2021    EGFRNONAA 77 09/09/2021     Lab Results   Component Value Date    ALT 21 10/23/2023    AST 19 10/23/2023    ALKPHOS 58  03/05/2020    BILITOT 0.9 10/23/2023           Radiology/Diagnostic Studies:    CXR 4/6/2023:  IMPRESSION:     No acute cardiopulmonary abnormality.        Mammo 12/8/2022:      Impression:     Benign diagnostic mammogram.  Return to annual screening mammography is recommended.     BI-RADS CATEGORY 2: BENIGN FINDING      Mammo 11/19/2021:    Impression:     Benign mammogram.         CXR 4/28/2022:    IMPRESSION:  No acute pulmonary process     PET 3/25/2021:    IMPRESSION:  Prior right-sided mastectomy with no PET/CT finding of FDG avid  residual/recurrent or metastatic disease.           MAmmo 11/16/2020:    Impression:     Benign diagnostic exam.  Return to annual screening mammography is recommended.     BI-RADS CATEGORY 2: BENIGN FINDING  PET  3/2/2020:      Impression       No evidence of FDG avid residual, recurrent or metastatic malignancy         Mammo 11/15/2019:    Impression:     No change from prior examination. No mammographic evidence of malignancy.     TECHNOLOGIST: Mirtha Hairston     BI-RADS CATEGORY 2- BENIGN     RECOMMENDATION: ANNUAL SCGREENING MAMMOGRAM              PET  2/11/2019:    IMPRESSION:  Negative for recurrent malignancy or metastatic disease.            Mammogram  10/30/2018:  IMPRESSION:    Changes related to previous lumpectomy and subsequent radiation therapy  posteriorly at the 12 o'clock position within the right breast.    No mammographic evidence of malignancy.      BI-RADS CATEGORY 2: BENIGN FINDING.      PET scan:   1/11/2018    IMPRESSION:    Negative for metastatic disease or residual/recurrent malignancy.          [facility] Unknown Rad Eap    Result Date: 10/30/2017  CMS MANDATED QUALITY DATA - MAMMOGRAPHY - 225 [facility] utilizes a reminder system to ensure that all patients receive reminder letters and/or direct phone calls for appointments. ?This includes ?reminders for routine screening mammograms, diagnostic mammograms, and other breast imaging  interventions when appropriate. ?This patient will be placed in the?appropriate reminder system. BILATERAL DIGITAL DIAGNOSTIC MAMMOGRAM with CAD with tomosynthesis CLINICAL INFORMATION: Short-term follow-up following lumpectomy for right breast carcinoma. Technologist: Ina Vega. FINDINGS: Comparison made with prior mammograms dating back to 08/13/2014. The breasts are heterogeneously dense, which lowers the sensitivity of mammography. There is scarring in the right breast at 12 o'clock position from previous lumpectomy. In the rest of the breast, there are no other areas of architectural distortion and there are no masses or suspicious grouped calcifications.     IMPRESSION: There is no evidence of malignancy. Recommend routine screening. BI-RADS CATEGORY 1: NEGATIVE. Read and electronically signed by: Jonah Gu MD on 10/30/2017 9:29 AM CDT JONAH GU MD      I have reviewed all available lab results and radiology reports.    Assessment/Plan:     (1) 77 y.o. female with diagnosis of right breast cancer  - stage IIIA   Triple neg  - s/p right lumpectomy in Oct 2015 by Dr Cadena with 4 out of 13 LN's that were positive  - s/p AC, taxol and XRT  - mammo 10/30/2018 was negative  - PET/CT 1/11/2018 is negative and repeat PET 2/11/2019 was also negative for recurrent disease  - recent PEt on 3/2/2020 negative for any recurrence  - repeat mammo due in  Nov 2020 9/13/2021:  - latest PET in march 2021 was adequate  - prior mammo on 11/16/2020 was negative  - doing well  - repeat mammo in Nov 2021 9/12/2022:  - she is doing well with no new issues  - mammo in Nov 2021 was negative  - CXR iN April 2022 was negative    10/30/2023:  - due for repeat mammo on 12/9/2023 and CxR in April 2024          (2) HTN - followed by Dr Topete; BP good currently    (3) Chronic diarrhea issues - seen by Dr Miles - resolved - it was found to be due to the krill oil       1. Malignant neoplasm of upper-outer  quadrant of right breast in female, estrogen receptor negative        2. Anemia due to chemotherapy        3. Estrogen receptor negative tumor status                PLAN:  1. Check up to date labs every 6 months  2. F/u with PCP, GYN, Gen Surg etc  3. RTC in 12 months  4. mammo next Dec 9th 2023; CXR in April 2024        Fax note to  Manpreet Topete MD, LENORE Rosas, Jd and Tammi    Discussion:     COVID-19 Discussion:    I had long discussion with patient and any applicable family about the COVID-19 coronavirus epidemic and the recommended precautions with regard to cancer and/or hematology patients. I have re-iterated the CDC recommendations for adequate hand washing, use of hand -like products, and coughing into elbow, etc. In addition, especially for our patients who are on chemotherapy and/or our otherwise immunocompromised patients, I have recommended avoidance of crowds, including movie theaters, restaurants, churches, etc. I have recommended avoidance of any sick or symptomatic family members and/or friends. I have also recommended avoidance of any raw and unwashed food products, and general avoidance of food items that have not been prepared by themselves. The patient has been asked to call us immediately with any symptom developments, issues, questions or other general concerns.     I have explained all of the above in detail and the patient understands all of the current recommendation(s). I have answered all of their questions to the best of my ability and to their complete satisfaction.   The patient is to continue with the current management plan.            Electronically signed by Abel Calle MD             Answers submitted by the patient for this visit:  Review of Systems Questionnaire (Submitted on 10/29/2023)  appetite change : No  unexpected weight change: No  mouth sores: No  visual disturbance: No  cough: No  shortness of breath: No  chest pain: No  abdominal pain:  No  diarrhea: No  frequency: No  back pain: No  rash: No  headaches: No  adenopathy: No  nervous/ anxious: No

## 2023-10-30 ENCOUNTER — OFFICE VISIT (OUTPATIENT)
Dept: HEMATOLOGY/ONCOLOGY | Facility: CLINIC | Age: 78
End: 2023-10-30
Payer: MEDICARE

## 2023-10-30 VITALS
SYSTOLIC BLOOD PRESSURE: 152 MMHG | WEIGHT: 132.88 LBS | HEART RATE: 85 BPM | TEMPERATURE: 97 F | BODY MASS INDEX: 25.96 KG/M2 | DIASTOLIC BLOOD PRESSURE: 82 MMHG

## 2023-10-30 DIAGNOSIS — Z17.1 MALIGNANT NEOPLASM OF UPPER-OUTER QUADRANT OF RIGHT BREAST IN FEMALE, ESTROGEN RECEPTOR NEGATIVE: Primary | ICD-10-CM

## 2023-10-30 DIAGNOSIS — C50.411 MALIGNANT NEOPLASM OF UPPER-OUTER QUADRANT OF RIGHT BREAST IN FEMALE, ESTROGEN RECEPTOR NEGATIVE: Primary | ICD-10-CM

## 2023-10-30 DIAGNOSIS — Z17.1 ESTROGEN RECEPTOR NEGATIVE TUMOR STATUS: ICD-10-CM

## 2023-10-30 DIAGNOSIS — D64.81 ANEMIA DUE TO CHEMOTHERAPY: ICD-10-CM

## 2023-10-30 DIAGNOSIS — T45.1X5A ANEMIA DUE TO CHEMOTHERAPY: ICD-10-CM

## 2023-10-30 PROCEDURE — 1101F PR PT FALLS ASSESS DOC 0-1 FALLS W/OUT INJ PAST YR: ICD-10-PCS | Mod: CPTII,S$GLB,, | Performed by: INTERNAL MEDICINE

## 2023-10-30 PROCEDURE — 99214 PR OFFICE/OUTPT VISIT, EST, LEVL IV, 30-39 MIN: ICD-10-PCS | Mod: S$GLB,,, | Performed by: INTERNAL MEDICINE

## 2023-10-30 PROCEDURE — 3288F FALL RISK ASSESSMENT DOCD: CPT | Mod: CPTII,S$GLB,, | Performed by: INTERNAL MEDICINE

## 2023-10-30 PROCEDURE — 1126F AMNT PAIN NOTED NONE PRSNT: CPT | Mod: CPTII,S$GLB,, | Performed by: INTERNAL MEDICINE

## 2023-10-30 PROCEDURE — 1101F PT FALLS ASSESS-DOCD LE1/YR: CPT | Mod: CPTII,S$GLB,, | Performed by: INTERNAL MEDICINE

## 2023-10-30 PROCEDURE — 3077F SYST BP >= 140 MM HG: CPT | Mod: CPTII,S$GLB,, | Performed by: INTERNAL MEDICINE

## 2023-10-30 PROCEDURE — 3077F PR MOST RECENT SYSTOLIC BLOOD PRESSURE >= 140 MM HG: ICD-10-PCS | Mod: CPTII,S$GLB,, | Performed by: INTERNAL MEDICINE

## 2023-10-30 PROCEDURE — 1160F PR REVIEW ALL MEDS BY PRESCRIBER/CLIN PHARMACIST DOCUMENTED: ICD-10-PCS | Mod: CPTII,S$GLB,, | Performed by: INTERNAL MEDICINE

## 2023-10-30 PROCEDURE — 3079F DIAST BP 80-89 MM HG: CPT | Mod: CPTII,S$GLB,, | Performed by: INTERNAL MEDICINE

## 2023-10-30 PROCEDURE — 3079F PR MOST RECENT DIASTOLIC BLOOD PRESSURE 80-89 MM HG: ICD-10-PCS | Mod: CPTII,S$GLB,, | Performed by: INTERNAL MEDICINE

## 2023-10-30 PROCEDURE — 1160F RVW MEDS BY RX/DR IN RCRD: CPT | Mod: CPTII,S$GLB,, | Performed by: INTERNAL MEDICINE

## 2023-10-30 PROCEDURE — 1159F PR MEDICATION LIST DOCUMENTED IN MEDICAL RECORD: ICD-10-PCS | Mod: CPTII,S$GLB,, | Performed by: INTERNAL MEDICINE

## 2023-10-30 PROCEDURE — 99214 OFFICE O/P EST MOD 30 MIN: CPT | Mod: S$GLB,,, | Performed by: INTERNAL MEDICINE

## 2023-10-30 PROCEDURE — 1159F MED LIST DOCD IN RCRD: CPT | Mod: CPTII,S$GLB,, | Performed by: INTERNAL MEDICINE

## 2023-10-30 PROCEDURE — 3288F PR FALLS RISK ASSESSMENT DOCUMENTED: ICD-10-PCS | Mod: CPTII,S$GLB,, | Performed by: INTERNAL MEDICINE

## 2023-10-30 PROCEDURE — 1126F PR PAIN SEVERITY QUANTIFIED, NO PAIN PRESENT: ICD-10-PCS | Mod: CPTII,S$GLB,, | Performed by: INTERNAL MEDICINE

## 2023-11-10 DIAGNOSIS — Z17.1 MALIGNANT NEOPLASM OF UPPER-OUTER QUADRANT OF RIGHT BREAST IN FEMALE, ESTROGEN RECEPTOR NEGATIVE: Primary | ICD-10-CM

## 2023-11-10 DIAGNOSIS — C50.411 MALIGNANT NEOPLASM OF UPPER-OUTER QUADRANT OF RIGHT BREAST IN FEMALE, ESTROGEN RECEPTOR NEGATIVE: Primary | ICD-10-CM

## 2024-01-12 ENCOUNTER — HOSPITAL ENCOUNTER (OUTPATIENT)
Dept: RADIOLOGY | Facility: HOSPITAL | Age: 79
Discharge: HOME | End: 2024-01-12
Attending: INTERNAL MEDICINE
Payer: MEDICARE

## 2024-01-12 DIAGNOSIS — C50.411 MALIGNANT NEOPLASM OF UPPER-OUTER QUADRANT OF RIGHT BREAST IN FEMALE, ESTROGEN RECEPTOR NEGATIVE: ICD-10-CM

## 2024-01-12 DIAGNOSIS — Z17.1 MALIGNANT NEOPLASM OF UPPER-OUTER QUADRANT OF RIGHT BREAST IN FEMALE, ESTROGEN RECEPTOR NEGATIVE: ICD-10-CM

## 2024-01-12 PROCEDURE — 77062 BREAST TOMOSYNTHESIS BI: CPT | Mod: TC,PO

## 2024-08-09 ENCOUNTER — HOSPITAL ENCOUNTER (OUTPATIENT)
Dept: RADIOLOGY | Facility: HOSPITAL | Age: 79
Discharge: HOME | End: 2024-08-09
Attending: INTERNAL MEDICINE
Payer: MEDICARE

## 2024-08-09 DIAGNOSIS — Z17.1 MALIGNANT NEOPLASM OF UPPER-OUTER QUADRANT OF RIGHT BREAST IN FEMALE, ESTROGEN RECEPTOR NEGATIVE: ICD-10-CM

## 2024-08-09 DIAGNOSIS — C50.411 MALIGNANT NEOPLASM OF UPPER-OUTER QUADRANT OF RIGHT BREAST IN FEMALE, ESTROGEN RECEPTOR NEGATIVE: ICD-10-CM

## 2024-08-09 PROCEDURE — 71046 X-RAY EXAM CHEST 2 VIEWS: CPT | Mod: 26,,, | Performed by: RADIOLOGY

## 2024-08-09 PROCEDURE — 71046 X-RAY EXAM CHEST 2 VIEWS: CPT | Mod: TC,PO

## 2024-10-07 DIAGNOSIS — Z78.0 POSTMENOPAUSAL STATE: Primary | ICD-10-CM

## 2024-10-21 ENCOUNTER — TELEPHONE (OUTPATIENT)
Facility: CLINIC | Age: 79
End: 2024-10-21
Payer: MEDICARE

## 2024-10-21 DIAGNOSIS — Z17.1 MALIGNANT NEOPLASM OF UPPER-OUTER QUADRANT OF RIGHT BREAST IN FEMALE, ESTROGEN RECEPTOR NEGATIVE: Primary | ICD-10-CM

## 2024-10-21 DIAGNOSIS — C50.411 MALIGNANT NEOPLASM OF UPPER-OUTER QUADRANT OF RIGHT BREAST IN FEMALE, ESTROGEN RECEPTOR NEGATIVE: Primary | ICD-10-CM

## 2024-10-28 ENCOUNTER — TELEPHONE (OUTPATIENT)
Facility: CLINIC | Age: 79
End: 2024-10-28
Payer: MEDICARE

## 2024-11-04 ENCOUNTER — OFFICE VISIT (OUTPATIENT)
Facility: CLINIC | Age: 79
End: 2024-11-04
Payer: MEDICARE

## 2024-11-04 VITALS
DIASTOLIC BLOOD PRESSURE: 82 MMHG | HEIGHT: 60 IN | RESPIRATION RATE: 17 BRPM | TEMPERATURE: 98 F | SYSTOLIC BLOOD PRESSURE: 177 MMHG | WEIGHT: 145.38 LBS | BODY MASS INDEX: 28.54 KG/M2 | HEART RATE: 71 BPM

## 2024-11-04 DIAGNOSIS — D64.81 ANEMIA DUE TO CHEMOTHERAPY: ICD-10-CM

## 2024-11-04 DIAGNOSIS — T45.1X5A ANEMIA DUE TO CHEMOTHERAPY: ICD-10-CM

## 2024-11-04 DIAGNOSIS — Z17.1 ESTROGEN RECEPTOR NEGATIVE: ICD-10-CM

## 2024-11-04 DIAGNOSIS — Z17.1 ESTROGEN RECEPTOR NEGATIVE TUMOR STATUS: ICD-10-CM

## 2024-11-04 DIAGNOSIS — Z17.1 MALIGNANT NEOPLASM OF UPPER-OUTER QUADRANT OF RIGHT BREAST IN FEMALE, ESTROGEN RECEPTOR NEGATIVE: Primary | ICD-10-CM

## 2024-11-04 DIAGNOSIS — C50.411 MALIGNANT NEOPLASM OF UPPER-OUTER QUADRANT OF RIGHT FEMALE BREAST: Primary | ICD-10-CM

## 2024-11-04 DIAGNOSIS — C50.411 MALIGNANT NEOPLASM OF UPPER-OUTER QUADRANT OF RIGHT BREAST IN FEMALE, ESTROGEN RECEPTOR NEGATIVE: Primary | ICD-10-CM

## 2024-11-04 PROCEDURE — 99214 OFFICE O/P EST MOD 30 MIN: CPT | Mod: S$GLB,,, | Performed by: INTERNAL MEDICINE

## 2024-11-04 PROCEDURE — 1101F PT FALLS ASSESS-DOCD LE1/YR: CPT | Mod: CPTII,S$GLB,, | Performed by: INTERNAL MEDICINE

## 2024-11-04 PROCEDURE — 1126F AMNT PAIN NOTED NONE PRSNT: CPT | Mod: CPTII,S$GLB,, | Performed by: INTERNAL MEDICINE

## 2024-11-04 PROCEDURE — G2211 COMPLEX E/M VISIT ADD ON: HCPCS | Mod: S$GLB,,, | Performed by: INTERNAL MEDICINE

## 2024-11-04 PROCEDURE — 3077F SYST BP >= 140 MM HG: CPT | Mod: CPTII,S$GLB,, | Performed by: INTERNAL MEDICINE

## 2024-11-04 PROCEDURE — 3288F FALL RISK ASSESSMENT DOCD: CPT | Mod: CPTII,S$GLB,, | Performed by: INTERNAL MEDICINE

## 2024-11-04 PROCEDURE — 99999 PR PBB SHADOW E&M-EST. PATIENT-LVL IV: CPT | Mod: PBBFAC,,, | Performed by: INTERNAL MEDICINE

## 2024-11-04 PROCEDURE — 1159F MED LIST DOCD IN RCRD: CPT | Mod: CPTII,S$GLB,, | Performed by: INTERNAL MEDICINE

## 2024-11-04 PROCEDURE — 3079F DIAST BP 80-89 MM HG: CPT | Mod: CPTII,S$GLB,, | Performed by: INTERNAL MEDICINE

## 2024-11-04 RX ORDER — ERGOCALCIFEROL 1.25 MG/1
50000 CAPSULE ORAL
COMMUNITY

## 2024-11-04 RX ORDER — PHYTONADIONE 5 MG/1
5 TABLET ORAL ONCE
COMMUNITY

## 2024-11-04 NOTE — PROGRESS NOTES
"   [facility] Hematology/Oncology  PROGRESS NOTE      Subjective:       Patient ID:   NAME: Eugenia Darling : 1945     78 y.o. female    Referring Doc: Yoselyn  Other Physicians: Tammi Fernandez, Jd    Chief Complaint:  Breast ca f/u    History of Present Illness:     Patient returns today for a regularly scheduled follow-up visit.  The patient is here by herself today.     She reports that she feels "great".  She is quite active    . She is doing ok with no new issues. She denies any CP, SOB, HA's or N/V.      She is due this 2025 for mammogram, and also plans to get dexascan    She saw Dr Topete three weeks ago    Discussed covid19 precautions - she had her vaccinations    ROS:   GEN: normal without any fever, night sweats or weight loss  HEENT: normal with no HA's, sore throat, stiff neck, changes in vision  CV: normal with no CP, SOB, PND, LEAVITT or orthopnea  PULM: normal with no SOB, cough, hemoptysis, sputum or pleuritic pain  GI:  no abdominal pain, nausea, vomiting, constipation,, melanotic stools, BRBPR, or hematemesis; no diarrhea  : normal with no hematuria, dysuria  BREAST: normal with no mass, discharge, pain  SKIN: normal with no rash, erythema, bruising, or swelling    Allergies:  Review of patient's allergies indicates:   Allergen Reactions    Adhesive tape-silicones Rash       Medications:    Current Outpatient Medications:     aspirin 81 MG Chew, Take 81 mg by mouth once daily., Disp: , Rfl:     CALCIUM CARBONATE (CALCIUM 500 ORAL), Take 500 mg by mouth once daily., Disp: , Rfl:     ergocalciferol (VITAMIN D2) 50,000 unit Cap, Take 50,000 Units by mouth every 7 days., Disp: , Rfl:     irbesartan (AVAPRO) 150 MG tablet, Take 150 mg by mouth every evening., Disp: , Rfl:     metoprolol tartrate (LOPRESSOR) 25 MG tablet, Take 25 mg by mouth 2 (two) times daily., Disp: , Rfl:     multivitamin (THERAGRAN) per tablet, Take 1 tablet by mouth once daily., Disp: , Rfl:     phytonadione, vitamin K1, " (MEPHYTON) 5 mg Tab, Take 5 mg by mouth once., Disp: , Rfl:     simvastatin (ZOCOR) 10 MG tablet, Take 10 mg by mouth every evening., Disp: , Rfl:     turmeric root extract 500 mg Cap, Take by mouth., Disp: , Rfl:     krill oil 500 mg Cap, Take by mouth. (Patient not taking: Reported on 11/4/2024), Disp: , Rfl:     LACTOBACILLUS RHAMNOSUS GG (CULTURELLE ORAL), Take by mouth., Disp: , Rfl:     losartan (COZAAR) 100 MG tablet, Take 100 mg by mouth once daily., Disp: , Rfl:     PMHx/PSHx Updates:  See patient's last visit with me on 10/30/2023  See H&P on Vol #1            Pathology:  See Vol #1          Objective:     Vitals:  Blood pressure (!) 177/82, pulse 71, temperature 97.5 °F (36.4 °C), temperature source Temporal, resp. rate 17, height 5' (1.524 m), weight 66 kg (145 lb 6.4 oz).    Physical Examination:   GEN: no apparent distress, comfortable; AAOx3  HEAD: atraumatic and normocephalic  EYES: no pallor, no icterus, PERRLA  ENT: OMM, no pharyngeal erythema, external ears WNL; no nasal discharge; no thrush  NECK: no masses, thyroid normal, trachea midline, no LAD/LN's, supple  CV: RRR with no murmur; normal pulse; normal S1 and S2; no pedal edema  CHEST: Normal respiratory effort; CTAB; normal breath sounds; no wheeze or crackles  ABDOM: nontender and nondistended; soft; normal bowel sounds; no rebound/guarding  MUSC/Skeletal: ROM normal; no crepitus; joints normal; no deformities or arthropathy  EXTREM: no clubbing, cyanosis, inflammation or swelling  SKIN: no rashes, lesions, ulcers, petechiae or subcutaneous nodules  : no turner  NEURO: grossly intact; motor/sensory WNL; AAOx3; no tremors  PSYCH: normal mood, affect and behavior  LYMPH: normal cervical, supraclavicular, axillary and groin LN's  BREAST: no changes with prior lumpectomy scar stable          Labs:        Lab Results   Component Value Date    WBC 5.2 10/28/2024    HGB 13.0 10/28/2024    HCT 40.9 10/28/2024    MCV 97.1 10/28/2024      10/28/2024     BMP  Lab Results   Component Value Date     10/28/2024    K 4.4 10/28/2024     10/28/2024    CO2 32 10/28/2024    BUN 20 10/28/2024    CREATININE 0.68 10/28/2024    CALCIUM 9.4 10/28/2024    ESTGFRAFRICA 89 09/09/2021    EGFRNONAA 77 09/09/2021     Lab Results   Component Value Date    ALT 19 10/28/2024    AST 19 10/28/2024    ALKPHOS 58 03/05/2020    BILITOT 0.7 10/28/2024           Radiology/Diagnostic Studies:    CXR 8/9/2024:    Impression:     No acute pulmonary process      Mammo 1/12/2024:    Impression:     1. No mammographic evidence of malignancy.  2. Yearly mammography is recommended.  BI-RADS CATEGORY 2: BENIGN FINDING.        CXR 4/6/2023:  IMPRESSION:     No acute cardiopulmonary abnormality.        Mammo 12/8/2022:      Impression:     Benign diagnostic mammogram.  Return to annual screening mammography is recommended.     BI-RADS CATEGORY 2: BENIGN FINDING      Mammo 11/19/2021:    Impression:     Benign mammogram.         CXR 4/28/2022:    IMPRESSION:  No acute pulmonary process     PET 3/25/2021:    IMPRESSION:  Prior right-sided mastectomy with no PET/CT finding of FDG avid  residual/recurrent or metastatic disease.           MAmmo 11/16/2020:    Impression:     Benign diagnostic exam.  Return to annual screening mammography is recommended.     BI-RADS CATEGORY 2: BENIGN FINDING  PET  3/2/2020:      Impression       No evidence of FDG avid residual, recurrent or metastatic malignancy         Mammo 11/15/2019:    Impression:     No change from prior examination. No mammographic evidence of malignancy.     TECHNOLOGIST: Mirtha Hairston     BI-RADS CATEGORY 2- BENIGN     RECOMMENDATION: ANNUAL SCGREENING MAMMOGRAM              PET  2/11/2019:    IMPRESSION:  Negative for recurrent malignancy or metastatic disease.            Mammogram  10/30/2018:  IMPRESSION:    Changes related to previous lumpectomy and subsequent radiation therapy  posteriorly at the 12 o'clock position  within the right breast.    No mammographic evidence of malignancy.      BI-RADS CATEGORY 2: BENIGN FINDING.      PET scan:   1/11/2018    IMPRESSION:    Negative for metastatic disease or residual/recurrent malignancy.          [facility] Unknown Rad Eap    Result Date: 10/30/2017  CMS MANDATED QUALITY DATA - MAMMOGRAPHY - 225 [facility] utilizes a reminder system to ensure that all patients receive reminder letters and/or direct phone calls for appointments. ?This includes ?reminders for routine screening mammograms, diagnostic mammograms, and other breast imaging interventions when appropriate. ?This patient will be placed in the?appropriate reminder system. BILATERAL DIGITAL DIAGNOSTIC MAMMOGRAM with CAD with tomosynthesis CLINICAL INFORMATION: Short-term follow-up following lumpectomy for right breast carcinoma. Technologist: Ina Vega. FINDINGS: Comparison made with prior mammograms dating back to 08/13/2014. The breasts are heterogeneously dense, which lowers the sensitivity of mammography. There is scarring in the right breast at 12 o'clock position from previous lumpectomy. In the rest of the breast, there are no other areas of architectural distortion and there are no masses or suspicious grouped calcifications.     IMPRESSION: There is no evidence of malignancy. Recommend routine screening. BI-RADS CATEGORY 1: NEGATIVE. Read and electronically signed by: Jonah Gu MD on 10/30/2017 9:29 AM CDT JONAH GU MD      I have reviewed all available lab results and radiology reports.    Assessment/Plan:     (1) 78 y.o. female with diagnosis of right breast cancer  - stage IIIA   Triple neg  - s/p right lumpectomy in Oct 2015 by Dr Cadena with 4 out of 13 LN's that were positive  - s/p AC, taxol and XRT  - mammo 10/30/2018 was negative  - PET/CT 1/11/2018 is negative and repeat PET 2/11/2019 was also negative for recurrent disease  - recent PEt on 3/2/2020 negative for any  recurrence  - repeat mammo due in  Nov 2020 9/13/2021:  - latest PET in march 2021 was adequate  - prior mammo on 11/16/2020 was negative  - doing well  - repeat mammo in Nov 2021 9/12/2022:  - she is doing well with no new issues  - mammo in Nov 2021 was negative  - CXR iN April 2022 was negative    10/30/2023:  - due for repeat mammo on 12/9/2023 and CxR in April 2024 11/4/2024:  - mammo due on 1/13/2025  - labs adequate  - HBP - followed by PCP     (2) HTN - followed by Dr Topete; BP good currently    (3) Chronic diarrhea issues - seen by Dr Miles - resolved - it was found to be due to the krill oil       1. Malignant neoplasm of upper-outer quadrant of right breast in female, estrogen receptor negative        2. Estrogen receptor negative tumor status        3. Anemia due to chemotherapy                PLAN:  1. Check up to date labs every 6 months  2. F/u with PCP, GYN, Gen Surg etc  3. RTC in 12 months  4. mammo next 1/13/2025        Fax note to  Manpreet Topete MD, LENORE Rosas, Jd and Tammi    Discussion:     COVID-19 Discussion:    I had long discussion with patient and any applicable family about the COVID-19 coronavirus epidemic and the recommended precautions with regard to cancer and/or hematology patients. I have re-iterated the CDC recommendations for adequate hand washing, use of hand -like products, and coughing into elbow, etc. In addition, especially for our patients who are on chemotherapy and/or our otherwise immunocompromised patients, I have recommended avoidance of crowds, including movie theaters, restaurants, churches, etc. I have recommended avoidance of any sick or symptomatic family members and/or friends. I have also recommended avoidance of any raw and unwashed food products, and general avoidance of food items that have not been prepared by themselves. The patient has been asked to call us immediately with any symptom developments, issues, questions or  other general concerns.     I have explained all of the above in detail and the patient understands all of the current recommendation(s). I have answered all of their questions to the best of my ability and to their complete satisfaction.   The patient is to continue with the current management plan.            Electronically signed by Abel Calle MD

## 2025-04-25 ENCOUNTER — HOSPITAL ENCOUNTER (OUTPATIENT)
Dept: RADIOLOGY | Facility: HOSPITAL | Age: 80
Discharge: HOME | End: 2025-04-25
Attending: INTERNAL MEDICINE
Payer: MEDICARE

## 2025-04-25 ENCOUNTER — HOSPITAL ENCOUNTER (OUTPATIENT)
Dept: RADIOLOGY | Facility: HOSPITAL | Age: 80
Discharge: HOME | End: 2025-04-25
Attending: NURSE PRACTITIONER
Payer: MEDICARE

## 2025-04-25 DIAGNOSIS — C50.411 MALIGNANT NEOPLASM OF UPPER-OUTER QUADRANT OF RIGHT FEMALE BREAST: ICD-10-CM

## 2025-04-25 DIAGNOSIS — D64.81 ANEMIA DUE TO CHEMOTHERAPY: ICD-10-CM

## 2025-04-25 DIAGNOSIS — C50.411 MALIGNANT NEOPLASM OF UPPER-OUTER QUADRANT OF RIGHT BREAST IN FEMALE, ESTROGEN RECEPTOR NEGATIVE: ICD-10-CM

## 2025-04-25 DIAGNOSIS — T45.1X5A ANEMIA DUE TO CHEMOTHERAPY: ICD-10-CM

## 2025-04-25 DIAGNOSIS — Z17.1 ESTROGEN RECEPTOR NEGATIVE: ICD-10-CM

## 2025-04-25 DIAGNOSIS — Z78.0 POSTMENOPAUSAL STATE: ICD-10-CM

## 2025-04-25 DIAGNOSIS — Z17.1 MALIGNANT NEOPLASM OF UPPER-OUTER QUADRANT OF RIGHT BREAST IN FEMALE, ESTROGEN RECEPTOR NEGATIVE: ICD-10-CM

## 2025-04-25 DIAGNOSIS — Z17.1 ESTROGEN RECEPTOR NEGATIVE TUMOR STATUS: ICD-10-CM

## 2025-04-25 PROCEDURE — 77080 DXA BONE DENSITY AXIAL: CPT | Mod: TC,PO

## 2025-04-25 PROCEDURE — 77066 DX MAMMO INCL CAD BI: CPT | Mod: 26,,, | Performed by: RADIOLOGY

## 2025-04-25 PROCEDURE — 77080 DXA BONE DENSITY AXIAL: CPT | Mod: 26,,, | Performed by: RADIOLOGY

## 2025-04-25 PROCEDURE — 77062 BREAST TOMOSYNTHESIS BI: CPT | Mod: TC,PO

## 2025-04-25 PROCEDURE — 71046 X-RAY EXAM CHEST 2 VIEWS: CPT | Mod: TC,PO

## 2025-04-25 PROCEDURE — 77062 BREAST TOMOSYNTHESIS BI: CPT | Mod: 26,,, | Performed by: RADIOLOGY

## 2025-04-25 PROCEDURE — 71046 X-RAY EXAM CHEST 2 VIEWS: CPT | Mod: 26,,, | Performed by: RADIOLOGY

## 2025-04-28 ENCOUNTER — OFFICE VISIT (OUTPATIENT)
Dept: ORTHOPEDICS | Facility: CLINIC | Age: 80
End: 2025-04-28
Payer: MEDICARE

## 2025-04-28 ENCOUNTER — HOSPITAL ENCOUNTER (OUTPATIENT)
Dept: RADIOLOGY | Facility: HOSPITAL | Age: 80
Discharge: HOME | End: 2025-04-28
Attending: PHYSICIAN ASSISTANT
Payer: MEDICARE

## 2025-04-28 VITALS
SYSTOLIC BLOOD PRESSURE: 170 MMHG | WEIGHT: 145.5 LBS | DIASTOLIC BLOOD PRESSURE: 88 MMHG | HEIGHT: 60 IN | BODY MASS INDEX: 28.57 KG/M2

## 2025-04-28 DIAGNOSIS — M17.12 PRIMARY OSTEOARTHRITIS OF LEFT KNEE: Primary | ICD-10-CM

## 2025-04-28 PROCEDURE — 1101F PT FALLS ASSESS-DOCD LE1/YR: CPT | Mod: CPTII,S$GLB,, | Performed by: PHYSICIAN ASSISTANT

## 2025-04-28 PROCEDURE — 1159F MED LIST DOCD IN RCRD: CPT | Mod: CPTII,S$GLB,, | Performed by: PHYSICIAN ASSISTANT

## 2025-04-28 PROCEDURE — 20610 DRAIN/INJ JOINT/BURSA W/O US: CPT | Mod: LT,S$GLB,, | Performed by: PHYSICIAN ASSISTANT

## 2025-04-28 PROCEDURE — 99203 OFFICE O/P NEW LOW 30 MIN: CPT | Mod: 25,S$GLB,, | Performed by: PHYSICIAN ASSISTANT

## 2025-04-28 PROCEDURE — 3077F SYST BP >= 140 MM HG: CPT | Mod: CPTII,S$GLB,, | Performed by: PHYSICIAN ASSISTANT

## 2025-04-28 PROCEDURE — 3288F FALL RISK ASSESSMENT DOCD: CPT | Mod: CPTII,S$GLB,, | Performed by: PHYSICIAN ASSISTANT

## 2025-04-28 PROCEDURE — 73562 X-RAY EXAM OF KNEE 3: CPT | Mod: TC,PN,LT

## 2025-04-28 PROCEDURE — 1160F RVW MEDS BY RX/DR IN RCRD: CPT | Mod: CPTII,S$GLB,, | Performed by: PHYSICIAN ASSISTANT

## 2025-04-28 PROCEDURE — 1125F AMNT PAIN NOTED PAIN PRSNT: CPT | Mod: CPTII,S$GLB,, | Performed by: PHYSICIAN ASSISTANT

## 2025-04-28 PROCEDURE — 99999 PR PBB SHADOW E&M-EST. PATIENT-LVL III: CPT | Mod: PBBFAC,,, | Performed by: PHYSICIAN ASSISTANT

## 2025-04-28 PROCEDURE — 73562 X-RAY EXAM OF KNEE 3: CPT | Mod: 26,LT,, | Performed by: RADIOLOGY

## 2025-04-28 PROCEDURE — 3079F DIAST BP 80-89 MM HG: CPT | Mod: CPTII,S$GLB,, | Performed by: PHYSICIAN ASSISTANT

## 2025-04-28 RX ORDER — CALCIUM CARB/VITAMIN D3/VIT K1 650MG-12.5
TABLET,CHEWABLE ORAL
COMMUNITY

## 2025-04-28 RX ORDER — TRIAMCINOLONE ACETONIDE 40 MG/ML
40 INJECTION, SUSPENSION INTRA-ARTICULAR; INTRAMUSCULAR
Status: DISCONTINUED | OUTPATIENT
Start: 2025-04-28 | End: 2025-04-28 | Stop reason: HOSPADM

## 2025-04-28 RX ADMIN — TRIAMCINOLONE ACETONIDE 40 MG: 40 INJECTION, SUSPENSION INTRA-ARTICULAR; INTRAMUSCULAR at 02:04

## 2025-04-28 NOTE — PROGRESS NOTES
[facility] ORTH[facility]EDICS  [address]  [place] [address]  Phone: [phone]   [phone]    Patient's PCP: Manpreet Topete MD  Referring Provider: Dr. 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    CALCIUM CARBONATE (CALCIUM 500 ORAL) Take 500 mg by mouth once daily.    calcium-vitamin D3-vitamin K 650 mg-12.5 mcg-40 mcg Chew Take by mouth.    irbesartan (AVAPRO) 150 MG tablet Take 150 mg by mouth every evening.    metoprolol tartrate (LOPRESSOR) 25 MG tablet Take 25 mg by mouth 2 (two) times daily.    multivitamin (THERAGRAN) per tablet Take 1 tablet by mouth once daily.    simvastatin (ZOCOR) 10 MG tablet Take 10 mg by mouth every evening.    turmeric root extract 500 mg Cap Take by mouth.    ergocalciferol (VITAMIN D2) 50,000 unit Cap Take 50,000 Units by mouth every 7 days.    krill oil 500 mg Cap Take by mouth. (Patient not taking: Reported on 11/4/2024)    LACTOBACILLUS RHAMNOSUS GG (CULTURELLE ORAL) Take by mouth.    losartan (COZAAR) 100 MG tablet Take 100 mg by mouth once daily.    phytonadione, vitamin K1, (MEPHYTON) 5 mg Tab Take 5 mg by mouth once.     No current facility-administered medications for this visit.       Review of patient's allergies indicates:   Allergen Reactions    Adhesive tape-silicones Rash        Family History   Problem Relation Name Age of Onset    Diabetes Mother      Heart disease Mother      Heart disease Father      Breast cancer Sister      Cancer Paternal Uncle         Social History[1]    Prior to meeting with the patient I reviewed the medical chart in [facility]. This included reviewing the previous progress notes from our office, review of the patient's last appointment with their primary care provider, review of any visits to the emergency room, and review of any pain management appointments or procedures.    History of present illness: 79 y.o. female,  presents to clinic today as a new patient for evaluation of left knee pain.  She does a lot of dancing, line dancing, she has noticed some intermittent knee pain which she is treated conservatively very well over the last couple of years last flare-up was about 8 or 9 months ago.  She had some x-rays of the left knee done in 2022 which she reported as negative.       Review of Systems:    Constitutional: Negative for chills, fever and weight loss.   HENT: Negative for congestion.    Eyes: Negative for discharge and redness.   Respiratory: Negative for cough and shortness of breath.    Cardiovascular: Negative for chest pain.   Gastrointestinal: Negative for nausea and vomiting.   Musculoskeletal: See HPI.   Skin: Negative for rash.   Neurological: Negative for headaches.   Endo/Heme/Allergies: Does not bruise/bleed easily.   Psychiatric/Behavioral: The patient is not nervous/anxious.    All other systems reviewed and are negative.       Objective:      Physical Examination:    Vital Signs:    Vitals:    04/28/25 1359   BP: (!) 170/88       Body mass index is 28.42 kg/m².    This a well-developed, well nourished patient in no acute distress.  They are alert and oriented and cooperative to examination.     Examination of the left knee, skin is dry and intact, no erythema or ecchymosis, no signs symptoms of infection.  No effusion.  Range motion  0-120 degrees.  Stable to anterior-posterior varus and valgus stress.  Homans signs negative, straight leg raise is negative.  Distally neurovascularly intact.      Pertinent New Results:        XRAY Report / Interpretation:   AP lateral sunrise views of the left knee taken today in the office demonstrate medial compartment narrowing, patellofemoral joint changes as well.  Moderate to severe osteoarthropathy of the left knee.    Procedure/s:  Large Joint Aspiration/Injection: L knee    Date/Time: 4/28/2025 2:15 PM    Performed by: Gael Shepherd PA  Authorized by: Gael Shepherd PA    Consent Done?:  Yes (Verbal)  Indications:  Pain  Site marked: the procedure site was marked    Timeout: prior to procedure the correct patient, procedure, and site was verified    Prep: patient was prepped and draped in usual sterile fashion      Local anesthesia used?: Yes    Local anesthetic:  Lidocaine 1% without epinephrine    Details:  Needle Size:  25 G  Ultrasonic Guidance for needle placement?: No    Approach:  Anterolateral  Location:  Knee  Site:  L knee  Medications:  40 mg triamcinolone acetonide 40 mg/mL  Patient tolerance:  Patient tolerated the procedure well with no immediate complications           Assessment:       1. Primary osteoarthritis of left knee      Plan:     Primary osteoarthritis of left knee  -     X-Ray Knee 3 View Left  -     Large Joint Aspiration/Injection: L knee        Follow up for 3 month f/u - left knee, injected 4/28/25.    I offered an injected the patient's left knee anterior lateral approach sterile technique lidocaine and triamcinolone.  She tolerated well.  She will follow up with us in 3 months.  We discussed viscosupplementation and this maybe reasonable depending on the amount of relief she gets from the steroid injection.  Ultimately she may need a knee replacement.    The patient and I had a thorough discussion today.  We discussed the working diagnosis as well as several  other potential alternative diagnoses.  We discussed treatment options, both conservative and surgical.  Conservative treatment options would include things such as activity modifications, workplace modifications, a period of rest, oral versus topical over the counter and oral versus topical prescription anti-inflammatory medications, physical therapy / occupational therapy, splinting / bracing, immobilization, corticosteroid injections, and others.        OSORIO Gil, PA-C        EMR Statement:  Please note that portions of this patient encounter record were imported from the patients electronic medical record and that others were dictated using voice recognition software. For these reasons grammatical errors, nonsensical language, and apparently contradictory statements may be present.  These should be disregarded or interpreted with respect to the context of the document.       [1]   Social History  Socioeconomic History    Marital status:    Tobacco Use    Smoking status: Never    Smokeless tobacco: Never   Substance and Sexual Activity    Alcohol use: No    Drug use: No

## 2025-04-28 NOTE — PROCEDURES
Large Joint Aspiration/Injection: L knee    Date/Time: 4/28/2025 2:15 PM    Performed by: Gael Shepherd PA  Authorized by: Gael Shepherd PA    Consent Done?:  Yes (Verbal)  Indications:  Pain  Site marked: the procedure site was marked    Timeout: prior to procedure the correct patient, procedure, and site was verified    Prep: patient was prepped and draped in usual sterile fashion      Local anesthesia used?: Yes    Local anesthetic:  Lidocaine 1% without epinephrine    Details:  Needle Size:  25 G  Ultrasonic Guidance for needle placement?: No    Approach:  Anterolateral  Location:  Knee  Site:  L knee  Medications:  40 mg triamcinolone acetonide 40 mg/mL  Patient tolerance:  Patient tolerated the procedure well with no immediate complications

## 2025-06-17 ENCOUNTER — TELEPHONE (OUTPATIENT)
Dept: ORTHOPEDICS | Facility: CLINIC | Age: 80
End: 2025-06-17
Payer: MEDICARE

## 2025-06-17 NOTE — TELEPHONE ENCOUNTER
----- Message from Cherie sent at 6/17/2025  1:05 PM CDT -----  She has appointment tomorrow, she wants to know will she get a Synvisc injection at this visit   none

## 2025-06-17 NOTE — TELEPHONE ENCOUNTER
Spoke with pt, I let her know that we would need to get a PA for the synvisc, and that Gael could talk to her about getting another injection and then getting the synvisc. She stated her understanding.

## 2025-06-18 ENCOUNTER — OFFICE VISIT (OUTPATIENT)
Dept: ORTHOPEDICS | Facility: CLINIC | Age: 80
End: 2025-06-18
Payer: MEDICARE

## 2025-06-18 VITALS — WEIGHT: 145.5 LBS | HEIGHT: 60 IN | BODY MASS INDEX: 28.57 KG/M2

## 2025-06-18 DIAGNOSIS — M17.12 PRIMARY OSTEOARTHRITIS OF LEFT KNEE: Primary | ICD-10-CM

## 2025-06-18 PROCEDURE — 99999 PR PBB SHADOW E&M-EST. PATIENT-LVL III: CPT | Mod: PBBFAC,,, | Performed by: PHYSICIAN ASSISTANT

## 2025-06-18 NOTE — PROGRESS NOTES
[facility] ORTHOPEDICS  [address]  [place] [address]  Phone: [phone]   [phone]    Patient's PCP:Manpreet Topete MD  Referring Provider: No ref. provider found    POST-OP Note:    Subjective:        Chief Complaint:   Chief Complaint   Patient presents with    Left Knee - Pain     L knee pain, inj 4.28.25. Had no relief at all, here today for gel injection. Denies locking up, giving way. From sitting to standing, first 20 steps are excruciating, walking with a limp however eases up with use.        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    calcium-vitamin D3-vitamin K 650 mg-12.5 mcg-40 mcg Chew Take by mouth.    irbesartan (AVAPRO) 150 MG tablet Take 150 mg by mouth every evening.    metoprolol tartrate (LOPRESSOR) 25 MG tablet Take 25 mg by mouth 2 (two) times daily.    multivitamin (THERAGRAN) per tablet Take 1 tablet by mouth once daily.    simvastatin (ZOCOR) 10 MG tablet Take 10 mg by mouth every evening.    turmeric root extract 500 mg Cap Take by mouth.    CALCIUM CARBONATE (CALCIUM 500 ORAL) Take 500 mg by mouth once daily.    ergocalciferol (VITAMIN D2) 50,000 unit Cap Take 50,000 Units by mouth every 7 days.    krill oil 500 mg Cap Take by mouth. (Patient not taking: Reported on 11/4/2024)    LACTOBACILLUS RHAMNOSUS GG (CULTURELLE ORAL) Take by mouth.    losartan (COZAAR) 100 MG tablet Take 100 mg by mouth once daily.    phytonadione, vitamin K1, (MEPHYTON) 5 mg Tab Take 5 mg by mouth once.     No current facility-administered medications  for this visit.       Review of patient's allergies indicates:   Allergen Reactions    Adhesive tape-silicones Rash       Family History   Problem Relation Name Age of Onset    Diabetes Mother      Heart disease Mother      Heart disease Father      Breast cancer Sister      Cancer Paternal Uncle         Social History[1]    History of present illness: 79 y.o. female returns to clinic today with known osteoarthritis in the Left knee/s.  Here today for viscosupplementation administration.    Review of Systems:    Musculoskeletal:  See HPI       Objective:        Physical Examination:    Vital Signs: There were no vitals filed for this visit.    Body mass index is 28.42 kg/m².    This a well-developed, well nourished patient in no acute distress.  They are alert and oriented and cooperative to examination.        Examination of the knee, skin is dry and intact, no erythema or ecchymosis, no signs and symptoms of infection.  No effusion.  Stable anterior-posterior varus and valgus stress.  Homans sign is negative, straight leg raise is negative, distally neurovascularly intact.    Range of motion: 0-110    Pertinent New Results:         XRAY Report / Interpretation:        Procedure/s:    Large Joint Aspiration/Injection: L knee    Date/Time: 6/18/2025 9:15 AM    Performed by: Gael Shepherd PA  Authorized by: Gael Shepherd PA    Consent Done?:  Yes (Verbal)  Indications:  Pain  Site marked: the procedure site was marked    Timeout: prior to procedure the correct patient, procedure, and site was verified    Prep: patient was prepped and draped in usual sterile fashion      Details:  Needle Size:  22 G  Ultrasonic Guidance for needle placement?: No    Location:  Knee  Site:  L knee  Medications:  48 mg hylan g-f 20 48 mg/6 mL  Patient tolerance:  Patient tolerated the procedure well with no immediate complications            Assessment:       1. Primary osteoarthritis of left knee      Plan:     Primary  osteoarthritis of left knee  -     Large Joint Aspiration/Injection: L knee        Follow up for L knee synvisc one inj 6/18/25, keep next appt.    Osteoarthritis knee, we injected Left knee/s today, anterior lateral approach sterile technique patient tolerated well.  We went over postprocedure instructions.  She really did not get a lot of significant relief with the steroid injection.  She had some immediate relief with dry think was from the anesthetic but over the next couple of days pain returned to its baseline.  We are going to try viscosupplementation but it we reviewed her x-rays and she really does have advanced medial compartment collapse when I think probably full-thickness cartilage loss over the weight-bearing surfaces of at least the medial compartment of the left knee.  Ultimately she may need a knee replacement I discussed this with her today in great detail.  We will see her back in 6 weeks    Risks and benefits were discussed with patient prior to receiving injection. Depending on injection type, risks include the possibility of infection, pain,  and cosmetic deformity at site of injection.     Receiving a viscosupplementation injection is a simple, in-office procedure.     After your injection, keep the following in mind:    In the first 48 hours after a viscosupplementation injection...    -You should be able to resume your normal day-to-day activities    -You should avoid activities that put excessive strain on your knee such as jogging, lifting or prolonged standing    -If you have any mild pain or swelling at the injection site, place an ice pack on your knee for 10 minutes or as needed for comfort.    In the months after an injection...    -Everyone responds differently, but in a medical study, many patients experienced pain relief starting one month after their injection.    Viscosupplementation can provide up to six months of knee pain relief    Viscosupplementation can be repeated safely  and many patients receive a second injection of Synvisc-One.    When your osteoarthritis knee pain returns, schedule a follow up appointment         OSORIO Gil, PA-C      EMR Statement:  Please note that portions of this patient encounter record were imported from the patients electronic medical record and that others were dictated using voice recognition software. For these reasons grammatical errors, nonsensical language, and apparently contradictory statements may be present.  These should be disregarded or interpreted with respect to the context of the document.       [1]   Social History  Socioeconomic History    Marital status:    Tobacco Use    Smoking status: Never    Smokeless tobacco: Never   Substance and Sexual Activity    Alcohol use: No    Drug use: No

## 2025-06-18 NOTE — PROCEDURES
Large Joint Aspiration/Injection: L knee    Date/Time: 6/18/2025 9:15 AM    Performed by: Gael Shepherd PA  Authorized by: Gael Shepherd PA    Consent Done?:  Yes (Verbal)  Indications:  Pain  Site marked: the procedure site was marked    Timeout: prior to procedure the correct patient, procedure, and site was verified    Prep: patient was prepped and draped in usual sterile fashion      Details:  Needle Size:  22 G  Ultrasonic Guidance for needle placement?: No    Location:  Knee  Site:  L knee  Medications:  48 mg hylan g-f 20 48 mg/6 mL  Patient tolerance:  Patient tolerated the procedure well with no immediate complications

## 2025-07-29 ENCOUNTER — OFFICE VISIT (OUTPATIENT)
Dept: ORTHOPEDICS | Facility: CLINIC | Age: 80
End: 2025-07-29
Payer: MEDICARE

## 2025-07-29 VITALS — HEIGHT: 60 IN | BODY MASS INDEX: 28.57 KG/M2 | WEIGHT: 145.5 LBS

## 2025-07-29 DIAGNOSIS — M17.12 PRIMARY OSTEOARTHRITIS OF LEFT KNEE: Primary | ICD-10-CM

## 2025-07-29 PROCEDURE — 3288F FALL RISK ASSESSMENT DOCD: CPT | Mod: CPTII,S$GLB,, | Performed by: ORTHOPAEDIC SURGERY

## 2025-07-29 PROCEDURE — 99213 OFFICE O/P EST LOW 20 MIN: CPT | Mod: S$GLB,,, | Performed by: ORTHOPAEDIC SURGERY

## 2025-07-29 PROCEDURE — 1160F RVW MEDS BY RX/DR IN RCRD: CPT | Mod: CPTII,S$GLB,, | Performed by: ORTHOPAEDIC SURGERY

## 2025-07-29 PROCEDURE — 99999 PR PBB SHADOW E&M-EST. PATIENT-LVL III: CPT | Mod: PBBFAC,,, | Performed by: ORTHOPAEDIC SURGERY

## 2025-07-29 PROCEDURE — 1125F AMNT PAIN NOTED PAIN PRSNT: CPT | Mod: CPTII,S$GLB,, | Performed by: ORTHOPAEDIC SURGERY

## 2025-07-29 PROCEDURE — 1101F PT FALLS ASSESS-DOCD LE1/YR: CPT | Mod: CPTII,S$GLB,, | Performed by: ORTHOPAEDIC SURGERY

## 2025-07-29 PROCEDURE — 1159F MED LIST DOCD IN RCRD: CPT | Mod: CPTII,S$GLB,, | Performed by: ORTHOPAEDIC SURGERY

## 2025-07-29 NOTE — PROGRESS NOTES
[facility]    Subjective:     Chief Complaint:   Chief Complaint   Patient presents with    Left Knee - Pain     Follow up for left knee inj 4/28/25, states pain has been improving since last visit. The injection did offer relief, has occasional pain        Past Medical History:   Diagnosis Date    Anemia due to chemotherapy 6/15/2017    Estrogen receptor negative status (ER-) 6/15/2017    Hyperlipidemia     Hypertension     Malignant neoplasm of upper-outer quadrant of right female breast     RIGHT       Past Surgical History:   Procedure Laterality Date    APPENDECTOMY      BREAST LUMPECTOMY Right 2017    TONSILLECTOMY         Current Outpatient Medications   Medication Sig    aspirin 81 MG Chew Take 81 mg by mouth once daily.    calcium-vitamin D3-vitamin K 650 mg-12.5 mcg-40 mcg Chew Take by mouth.    irbesartan (AVAPRO) 150 MG tablet Take 150 mg by mouth every evening.    metoprolol tartrate (LOPRESSOR) 25 MG tablet Take 25 mg by mouth 2 (two) times daily.    multivitamin (THERAGRAN) per tablet Take 1 tablet by mouth once daily.    simvastatin (ZOCOR) 10 MG tablet Take 10 mg by mouth every evening.    turmeric root extract 500 mg Cap Take by mouth.    CALCIUM CARBONATE (CALCIUM 500 ORAL) Take 500 mg by mouth once daily.    ergocalciferol (VITAMIN D2) 50,000 unit Cap Take 50,000 Units by mouth every 7 days.    krill oil 500 mg Cap Take by mouth. (Patient not taking: Reported on 11/4/2024)    LACTOBACILLUS RHAMNOSUS GG (CULTURELLE ORAL) Take by mouth.    losartan (COZAAR) 100 MG tablet Take 100 mg by mouth once daily.    phytonadione, vitamin K1, (MEPHYTON) 5 mg Tab Take 5 mg by mouth once.     No current facility-administered medications for this visit.       Review of patient's allergies indicates:   Allergen Reactions    Adhesive tape-silicones Rash       Family History   Problem Relation Name Age of Onset    Diabetes Mother      Heart disease Mother      Heart disease Father      Breast cancer  Sister      Cancer Paternal Uncle         Social History[1]    History of present illness:  Ms. Bello comes in today for follow-up for her left knee.  She has known arthrosis in the knee.  Last seen and injected right at 6 weeks ago with good relief of her symptoms.  Unfortunately the pain has returned.  Denies any new injury or trauma.    Review of Systems:    Constitution: Negative for chills, fever, and sweats.  Negative for unexplained weight loss.    HENT:  Negative for headaches and blurry vision.    Cardiovascular:Negative for chest pain or irregular heart beat. Negative for hypertension.    Respiratory:  Negative for cough and shortness of breath.    Gastrointestinal: Negative for abdominal pain, heartburn, melena, nausea, and vomitting.    Genitourinary:  Negative bladder incontinence and dysuria.    Musculoskeletal:  See HPI for details.     Neurological: Negative for numbness.    Psychiatric/Behavioral: Negative for depression.  The patient is not nervous/anxious.      Endocrine: Negative for polyuria    Hematologic/Lymphatic: Negative for bleeding problem.  Does not bruise/bleed easily.    Skin: Negative for poor would healing and rash    Objective:      Physical Examination:    Vital Signs:  There were no vitals filed for this visit.    Body mass index is 28.42 kg/m².    This a well-developed, well nourished patient in no acute distress.  They are alert and oriented and cooperative to examination.          Left knee exam: Skin to left knee clean dry and intact.  No erythema or ecchymosis.  No signs or symptoms of infection.  Neurovascularly intact throughout the left lower extremity.  Negative Homans on the left.  Some crepitus with range of motioning of the knee.  She is diffusely tender along the medial aspect.  She can weightbear as tolerated on the left lower extremity.   Left knee range of motion 0-110 degrees.  The knee is stable to varus and valgus stresses.  There is no effusion.    Pertinent  "New Results:    XRAY Report / Interpretation:     No new radiographs taken on today's clinic visit.    Assessment/Plan:        1. Left knee osteoarthritis.      She continues to do well from her injection 6 weeks ago.  She has been able to resume/ continue her regular activities of daily living without much interference.  Ultimately, she is going to need a total knee arthroplasty to address the OA in her knee.  She is aware of this.  This was discussed with her today at length.  We will schedule her for follow-up in 6 weeks for repeat injection if she begins to experience a recurrence of symptoms.  If she continues to do well, she can simply cancel and just follow up on an as-needed basis for repeat injections or to further discuss / schedule surgical intervention.      Israel Shah, Physician Assistant, served in the capacity as a "scribe" for this patient encounter.  A "face-to-face" encounter occurred with Dr. Osbaldo Wilder on this date.  The treatment plan and medical decision-making is outlined above. Patient was seen and examined with a chaperone.       This note was created using Dragon voice recognition software that occasionally misinterpreted phrases or words.               [1]   Social History  Socioeconomic History    Marital status:    Tobacco Use    Smoking status: Never    Smokeless tobacco: Never   Substance and Sexual Activity    Alcohol use: No    Drug use: No     "